# Patient Record
Sex: MALE | Race: WHITE | NOT HISPANIC OR LATINO | URBAN - METROPOLITAN AREA
[De-identification: names, ages, dates, MRNs, and addresses within clinical notes are randomized per-mention and may not be internally consistent; named-entity substitution may affect disease eponyms.]

---

## 2017-08-03 ENCOUNTER — APPOINTMENT (RX ONLY)
Dept: URBAN - METROPOLITAN AREA CLINIC 349 | Facility: CLINIC | Age: 57
Setting detail: DERMATOLOGY
End: 2017-08-03

## 2017-08-03 DIAGNOSIS — Z41.9 ENCOUNTER FOR PROCEDURE FOR PURPOSES OTHER THAN REMEDYING HEALTH STATE, UNSPECIFIED: ICD-10-CM

## 2017-08-03 PROBLEM — Z85.79 PERSONAL HISTORY OF OTHER MALIGNANT NEOPLASMS OF LYMPHOID, HEMATOPOIETIC AND RELATED TISSUES: Status: ACTIVE | Noted: 2017-08-03

## 2017-08-03 PROCEDURE — ? COSMETIC CONSULTATION: SKIN CARE PRODUCTS AND SERVICES

## 2017-08-15 ENCOUNTER — APPOINTMENT (RX ONLY)
Dept: URBAN - METROPOLITAN AREA CLINIC 349 | Facility: CLINIC | Age: 57
Setting detail: DERMATOLOGY
End: 2017-08-15

## 2017-08-15 DIAGNOSIS — Z41.9 ENCOUNTER FOR PROCEDURE FOR PURPOSES OTHER THAN REMEDYING HEALTH STATE, UNSPECIFIED: ICD-10-CM

## 2017-08-15 PROCEDURE — ? COSMETIC CONSULTATION: SKIN CARE PRODUCTS AND SERVICES

## 2017-09-08 ENCOUNTER — APPOINTMENT (RX ONLY)
Dept: URBAN - METROPOLITAN AREA CLINIC 349 | Facility: CLINIC | Age: 57
Setting detail: DERMATOLOGY
End: 2017-09-08

## 2017-09-08 DIAGNOSIS — Z41.9 ENCOUNTER FOR PROCEDURE FOR PURPOSES OTHER THAN REMEDYING HEALTH STATE, UNSPECIFIED: ICD-10-CM

## 2017-09-08 PROCEDURE — ? CHEMICAL PEEL

## 2017-09-08 NOTE — PROCEDURE: CHEMICAL PEEL
Price (Use Numbers Only, No Special Characters Or $): 85
Prep: The treated area was degreased with pre-peel cleanser, and vaseline was applied for protection of mucous membranes.
Chemical Peel: Obagi Blue
Detail Level: Simple
Post Peel Care: After the procedure, a post-peel cream was applied to the treated areas. Sun protection and post-care instructions were reviewed with the patient.
Consent: Prior to the procedure, written consent was obtained and risks were reviewed, including but not limited to: redness, peeling, blistering, pigmentary change, scarring, infection, and pain.
Post-Care Instructions: I reviewed with the patient in detail post-care instructions. Patient should avoid sun exposure and wear sun protection.
Treatment Number: 0

## 2017-10-24 ENCOUNTER — APPOINTMENT (RX ONLY)
Dept: URBAN - METROPOLITAN AREA CLINIC 349 | Facility: CLINIC | Age: 57
Setting detail: DERMATOLOGY
End: 2017-10-24

## 2017-10-24 DIAGNOSIS — Z41.9 ENCOUNTER FOR PROCEDURE FOR PURPOSES OTHER THAN REMEDYING HEALTH STATE, UNSPECIFIED: ICD-10-CM

## 2017-10-24 PROCEDURE — ? CHEMICAL PEEL

## 2017-10-24 NOTE — PROCEDURE: CHEMICAL PEEL
Chemical Peel: Obagi Blue Peel Radiance
Number Of Layers: 3
Prep: The treated area was degreased, after treatment I added a sulphuric based mask. His skin was calm after.
Post-Care Instructions: I reviewed with the patient in detail post-care instructions. Patient should avoid sun exposure and wear sun protection.
Consent: Prior to the procedure, written consent was obtained and risks were reviewed, including but not limited to: redness, peeling, blistering, pigmentary change, scarring, infection, and pain.
Detail Level: Simple
Price (Use Numbers Only, No Special Characters Or $): 85
Post Peel Care: After the procedure, a post-peel cream was applied to the treated areas. Sun protection and post-care instructions were reviewed with the patient. The post balm treatment caused the pt to have a reaction and skin became red and inflamed after application. Removed product and cooled skin with cool towels and ice packs,  and applied topical steroid . The product he reacted to had panthenol, and dimethicone.Jessica recommended desolate 2 times a day for a week. gave him enough samples to cover a week of treatment.

## 2017-11-09 ENCOUNTER — APPOINTMENT (RX ONLY)
Dept: URBAN - METROPOLITAN AREA CLINIC 349 | Facility: CLINIC | Age: 57
Setting detail: DERMATOLOGY
End: 2017-11-09

## 2017-11-09 DIAGNOSIS — L57.0 ACTINIC KERATOSIS: ICD-10-CM

## 2017-11-09 DIAGNOSIS — D22 MELANOCYTIC NEVI: ICD-10-CM

## 2017-11-09 DIAGNOSIS — L82.1 OTHER SEBORRHEIC KERATOSIS: ICD-10-CM

## 2017-11-09 PROBLEM — D22.5 MELANOCYTIC NEVI OF TRUNK: Status: ACTIVE | Noted: 2017-11-09

## 2017-11-09 PROBLEM — F32.9 MAJOR DEPRESSIVE DISORDER, SINGLE EPISODE, UNSPECIFIED: Status: ACTIVE | Noted: 2017-11-09

## 2017-11-09 PROBLEM — C44.319 BASAL CELL CARCINOMA OF SKIN OF OTHER PARTS OF FACE: Status: ACTIVE | Noted: 2017-11-09

## 2017-11-09 PROCEDURE — ? COUNSELING

## 2017-11-09 PROCEDURE — 99213 OFFICE O/P EST LOW 20 MIN: CPT | Mod: 25

## 2017-11-09 PROCEDURE — ? PATHOLOGY BILLING

## 2017-11-09 PROCEDURE — ? SHAVE REMOVAL AND DESTRUCTION

## 2017-11-09 PROCEDURE — 88305 TISSUE EXAM BY PATHOLOGIST: CPT

## 2017-11-09 PROCEDURE — 17281 DSTR MAL LS F/E/E/N/L/M .6-1: CPT

## 2017-11-09 PROCEDURE — A4550 SURGICAL TRAYS: HCPCS

## 2017-11-09 ASSESSMENT — LOCATION SIMPLE DESCRIPTION DERM
LOCATION SIMPLE: LEFT UPPER BACK
LOCATION SIMPLE: RIGHT LOWER BACK
LOCATION SIMPLE: ABDOMEN
LOCATION SIMPLE: RIGHT UPPER BACK
LOCATION SIMPLE: LEFT ZYGOMA
LOCATION SIMPLE: LEFT LOWER BACK

## 2017-11-09 ASSESSMENT — LOCATION DETAILED DESCRIPTION DERM
LOCATION DETAILED: LEFT SUPERIOR LATERAL MIDBACK
LOCATION DETAILED: RIGHT LATERAL ABDOMEN
LOCATION DETAILED: RIGHT SUPERIOR LATERAL MIDBACK
LOCATION DETAILED: LEFT LATERAL ABDOMEN
LOCATION DETAILED: PERIUMBILICAL SKIN
LOCATION DETAILED: LEFT CENTRAL ZYGOMA
LOCATION DETAILED: LEFT MID-UPPER BACK
LOCATION DETAILED: RIGHT SUPERIOR UPPER BACK

## 2017-11-09 ASSESSMENT — LOCATION ZONE DERM
LOCATION ZONE: TRUNK
LOCATION ZONE: FACE

## 2017-11-09 NOTE — PROCEDURE: SHAVE REMOVAL AND DESTRUCTION
Bill [id number] For Specimen Handling/Conveyance To Laboratory?: no
Accession #: [id number] [email or web address]
Post-Care Instructions: I reviewed with the patient in detail post-care instructions. Patient is to keep the biopsy site dry overnight, and then apply bacitracin twice daily until healed. Patient may apply hydrogen peroxide soaks to remove any crusting.  After the procedure, the patient was observed for 5-10 minutes and was oriented to,person, place and time and denied feeling dizzy, queasy and stated that they were not going to faint
Render Post-Care Instructions In Note?: yes
Billing Type: Third-Party Bill
Cautery Type: electrodesiccation
Dressing: Band-Aid
Consent: Written consent was obtained and risks were reviewed including but not limited to scarring, infection, bleeding, scabbing, incomplete removal, nerve damage and allergy to anesthesia.
Wound Care: Vaseline
Anesthesia Volume In Cc: 0
Size Of Lesion In Cm: 0.7
Anesthesia Type: 1% lidocaine with epinephrine
Hemostasis: Electrocautery
Anesthesia Volume In Cc: 0.3
Number Of Curettages: 1
Notification Instructions: Patient will be notified of biopsy results. However, patient instructed to call the office if not contacted within 2 weeks.
Size After Destruction (Required For Destruction Billing): 0.8
Bill As?: Note: Bill Malignant Destruction If Path Confirms Malignant Lesion. Only Bill As Shave Removal If Path Comes Back Benign. Do Not Bill Shave Removal On Malignant Lesions.: Malignant Destruction
Detail Level: Detailed

## 2017-12-04 PROBLEM — I10 ESSENTIAL HYPERTENSION: Status: ACTIVE | Noted: 2017-12-04

## 2018-01-05 PROBLEM — F33.9 DEPRESSION, RECURRENT (HCC): Status: ACTIVE | Noted: 2018-01-05

## 2018-01-05 PROBLEM — F33.9 DEPRESSION, RECURRENT (HCC): Status: RESOLVED | Noted: 2018-01-05 | Resolved: 2018-01-05

## 2018-01-05 PROBLEM — F33.40 DEPRESSION, MAJOR, RECURRENT, IN REMISSION (HCC): Status: ACTIVE | Noted: 2018-01-05

## 2018-01-05 PROBLEM — R39.11 BENIGN PROSTATIC HYPERPLASIA WITH HESITANCY: Status: ACTIVE | Noted: 2018-01-05

## 2018-01-05 PROBLEM — N40.1 BENIGN PROSTATIC HYPERPLASIA WITH HESITANCY: Status: ACTIVE | Noted: 2018-01-05

## 2018-05-10 ENCOUNTER — APPOINTMENT (RX ONLY)
Dept: URBAN - METROPOLITAN AREA CLINIC 349 | Facility: CLINIC | Age: 58
Setting detail: DERMATOLOGY
End: 2018-05-10

## 2018-05-10 DIAGNOSIS — Z85.828 PERSONAL HISTORY OF OTHER MALIGNANT NEOPLASM OF SKIN: ICD-10-CM

## 2018-05-10 DIAGNOSIS — L57.0 ACTINIC KERATOSIS: ICD-10-CM

## 2018-05-10 DIAGNOSIS — D22 MELANOCYTIC NEVI: ICD-10-CM

## 2018-05-10 DIAGNOSIS — L82.1 OTHER SEBORRHEIC KERATOSIS: ICD-10-CM

## 2018-05-10 PROBLEM — D22.39 MELANOCYTIC NEVI OF OTHER PARTS OF FACE: Status: ACTIVE | Noted: 2018-05-10

## 2018-05-10 PROCEDURE — ? PATHOLOGY BILLING

## 2018-05-10 PROCEDURE — 88305 TISSUE EXAM BY PATHOLOGIST: CPT

## 2018-05-10 PROCEDURE — 17000 DESTRUCT PREMALG LESION: CPT

## 2018-05-10 PROCEDURE — ? BIOPSY BY SHAVE METHOD

## 2018-05-10 PROCEDURE — ? LIQUID NITROGEN

## 2018-05-10 PROCEDURE — A4550 SURGICAL TRAYS: HCPCS

## 2018-05-10 PROCEDURE — 11101: CPT

## 2018-05-10 PROCEDURE — 11100: CPT | Mod: 59

## 2018-05-10 PROCEDURE — ? OTHER

## 2018-05-10 PROCEDURE — ? COUNSELING

## 2018-05-10 PROCEDURE — 99213 OFFICE O/P EST LOW 20 MIN: CPT | Mod: 25

## 2018-05-10 PROCEDURE — 17003 DESTRUCT PREMALG LES 2-14: CPT

## 2018-05-10 ASSESSMENT — LOCATION DETAILED DESCRIPTION DERM
LOCATION DETAILED: LEFT INFERIOR CENTRAL MALAR CHEEK
LOCATION DETAILED: LEFT SUPERIOR LATERAL MALAR CHEEK
LOCATION DETAILED: LEFT DISTAL PRETIBIAL REGION
LOCATION DETAILED: RIGHT DISTAL PRETIBIAL REGION
LOCATION DETAILED: RIGHT MEDIAL FRONTAL SCALP
LOCATION DETAILED: LEFT DISTAL PRETIBIAL REGION
LOCATION DETAILED: LEFT INFERIOR LATERAL MALAR CHEEK
LOCATION DETAILED: LEFT INFERIOR CENTRAL MALAR CHEEK
LOCATION DETAILED: LEFT SUPERIOR PARIETAL SCALP
LOCATION DETAILED: INFERIOR THORACIC SPINE
LOCATION DETAILED: LEFT SUPERIOR LATERAL MALAR CHEEK
LOCATION DETAILED: LEFT SUPERIOR HELIX
LOCATION DETAILED: LEFT SUPERIOR MEDIAL MIDBACK
LOCATION DETAILED: LEFT INFERIOR MEDIAL MIDBACK
LOCATION DETAILED: LEFT SUPERIOR CENTRAL BUCCAL CHEEK
LOCATION DETAILED: RIGHT CENTRAL FRONTAL SCALP
LOCATION DETAILED: LEFT SUPERIOR HELIX
LOCATION DETAILED: RIGHT CENTRAL PARIETAL SCALP

## 2018-05-10 ASSESSMENT — LOCATION SIMPLE DESCRIPTION DERM
LOCATION SIMPLE: LEFT PRETIBIAL REGION
LOCATION SIMPLE: LEFT CHEEK
LOCATION SIMPLE: LEFT EAR
LOCATION SIMPLE: LEFT EAR
LOCATION SIMPLE: LEFT PRETIBIAL REGION
LOCATION SIMPLE: LEFT LOWER BACK
LOCATION SIMPLE: UPPER BACK
LOCATION SIMPLE: LEFT LOWER BACK
LOCATION SIMPLE: RIGHT SCALP
LOCATION SIMPLE: LEFT CHEEK
LOCATION SIMPLE: RIGHT PRETIBIAL REGION
LOCATION SIMPLE: SCALP

## 2018-05-10 ASSESSMENT — LOCATION ZONE DERM
LOCATION ZONE: LEG
LOCATION ZONE: TRUNK
LOCATION ZONE: EAR
LOCATION ZONE: TRUNK
LOCATION ZONE: FACE
LOCATION ZONE: SCALP
LOCATION ZONE: FACE
LOCATION ZONE: EAR
LOCATION ZONE: LEG

## 2018-05-10 NOTE — PROCEDURE: BIOPSY BY SHAVE METHOD
Dressing: bandage
Type Of Destruction Used: Curettage
Electrodesiccation And Curettage Text: The wound bed was treated with electrodesiccation and curettage after the biopsy was performed.
Anesthesia Type: 1% lidocaine without epinephrine
Size Of Lesion In Cm: 0
Notification Instructions: Patient will be notified of biopsy results. However, patient instructed to call the office if not contacted within 2 weeks.
Biopsy Method: Dermablade
Was A Bandage Applied: Yes
Electrodesiccation Text: The wound bed was treated with electrodesiccation after the biopsy was performed.
Post-Care Instructions: I reviewed with the patient in detail post-care instructions. Patient is to keep the biopsy site dry overnight, and then apply bacitracin twice daily until healed. Patient may apply hydrogen peroxide soaks to remove any crusting.  After the procedure, the patient was observed for 5-10 minutes and was oriented to person, place and time and demied feeling dizzy, queasy, and stated that they did not feel that they were going to faint.
Bill 95304 For Specimen Handling/Conveyance To Laboratory?: no
Accession #: [id number] [email or web address] read
Hemostasis: Drysol
Billing Type: Third-Party Bill
Detail Level: Detailed
Size Of Lesion In Cm: 0.7
Consent: Written consent was obtained and risks were reviewed including but not limited to scarring, infection, bleeding, scabbing, incomplete removal, nerve damage and allergy to anesthesia.
Silver Nitrate Text: The wound bed was treated with silver nitrate after the biopsy was performed.
Biopsy Type: H and E
Cryotherapy Text: The wound bed was treated with cryotherapy after the biopsy was performed.
Wound Care: Vaseline
Anesthesia Volume In Cc (Will Not Render If 0): 0.5

## 2018-05-10 NOTE — PROCEDURE: LIQUID NITROGEN
Render Post-Care Instructions In Note?: no
Detail Level: Simple
Number Of Freeze-Thaw Cycles: 2 freeze-thaw cycles
Consent: The patient's consent was obtained including but not limited to risks of crusting, scabbing, blistering, scarring, darker or lighter pigmentary change, recurrence, incomplete removal and infection.
Duration Of Freeze Thaw-Cycle (Seconds): 3
Post-Care Instructions: I reviewed with the patient in detail post-care instructions. Patient is to wear sunprotection, and avoid picking at any of the treated lesions. Pt may apply Vaseline to crusted or scabbing areas.

## 2018-06-19 ENCOUNTER — ANESTHESIA EVENT (OUTPATIENT)
Dept: SURGERY | Age: 58
End: 2018-06-19
Payer: COMMERCIAL

## 2018-06-20 ENCOUNTER — HOSPITAL ENCOUNTER (OUTPATIENT)
Age: 58
Setting detail: OUTPATIENT SURGERY
Discharge: HOME | End: 2018-06-20
Attending: ORTHOPAEDIC SURGERY | Admitting: ORTHOPAEDIC SURGERY
Payer: COMMERCIAL

## 2018-06-20 ENCOUNTER — ANESTHESIA (OUTPATIENT)
Dept: SURGERY | Age: 58
End: 2018-06-20
Payer: COMMERCIAL

## 2018-06-20 VITALS
OXYGEN SATURATION: 97 % | RESPIRATION RATE: 16 BRPM | DIASTOLIC BLOOD PRESSURE: 84 MMHG | SYSTOLIC BLOOD PRESSURE: 140 MMHG | HEART RATE: 86 BPM | TEMPERATURE: 97.6 F

## 2018-06-20 DIAGNOSIS — L76.82 PAIN AT SURGICAL INCISION: Primary | ICD-10-CM

## 2018-06-20 PROCEDURE — 74011250636 HC RX REV CODE- 250/636: Performed by: ANESTHESIOLOGY

## 2018-06-20 PROCEDURE — 77030018836 HC SOL IRR NACL ICUM -A: Performed by: ORTHOPAEDIC SURGERY

## 2018-06-20 PROCEDURE — 76010000162 HC OR TIME 1.5 TO 2 HR INTENSV-TIER 1: Performed by: ORTHOPAEDIC SURGERY

## 2018-06-20 PROCEDURE — 74011250636 HC RX REV CODE- 250/636

## 2018-06-20 PROCEDURE — 77030002933 HC SUT MCRYL J&J -A: Performed by: ORTHOPAEDIC SURGERY

## 2018-06-20 PROCEDURE — 77030032490 HC SLV COMPR SCD KNE COVD -B: Performed by: ORTHOPAEDIC SURGERY

## 2018-06-20 PROCEDURE — 76210000063 HC OR PH I REC FIRST 0.5 HR: Performed by: ORTHOPAEDIC SURGERY

## 2018-06-20 PROCEDURE — 77030011640 HC PAD GRND REM COVD -A: Performed by: ORTHOPAEDIC SURGERY

## 2018-06-20 PROCEDURE — 77030000032 HC CUF TRNQT ZIMM -B: Performed by: ORTHOPAEDIC SURGERY

## 2018-06-20 PROCEDURE — 76010010054 HC POST OP PAIN BLOCK: Performed by: ORTHOPAEDIC SURGERY

## 2018-06-20 PROCEDURE — 77030019940 HC BLNKT HYPOTHRM STRY -B: Performed by: ANESTHESIOLOGY

## 2018-06-20 PROCEDURE — 77030003602 HC NDL NRV BLK BBMI -B: Performed by: ANESTHESIOLOGY

## 2018-06-20 PROCEDURE — 77030008467 HC STPLR SKN COVD -B: Performed by: ORTHOPAEDIC SURGERY

## 2018-06-20 PROCEDURE — 74011000250 HC RX REV CODE- 250

## 2018-06-20 PROCEDURE — C1713 ANCHOR/SCREW BN/BN,TIS/BN: HCPCS | Performed by: ORTHOPAEDIC SURGERY

## 2018-06-20 PROCEDURE — 77030020143 HC AIRWY LARYN INTUB CGAS -A: Performed by: ANESTHESIOLOGY

## 2018-06-20 PROCEDURE — 76942 ECHO GUIDE FOR BIOPSY: CPT | Performed by: ORTHOPAEDIC SURGERY

## 2018-06-20 PROCEDURE — 76210000020 HC REC RM PH II FIRST 0.5 HR: Performed by: ORTHOPAEDIC SURGERY

## 2018-06-20 PROCEDURE — 74011250636 HC RX REV CODE- 250/636: Performed by: ORTHOPAEDIC SURGERY

## 2018-06-20 PROCEDURE — 77030003902 HC BIT DRL TWST ZIMM -B: Performed by: ORTHOPAEDIC SURGERY

## 2018-06-20 PROCEDURE — 76060000035 HC ANESTHESIA 2 TO 2.5 HR: Performed by: ORTHOPAEDIC SURGERY

## 2018-06-20 DEVICE — ANCHOR SUT L14MM DIA4.5MM FULL THRD W/ TWO SZ 1 FIBERWIRE: Type: IMPLANTABLE DEVICE | Site: KNEE | Status: FUNCTIONAL

## 2018-06-20 DEVICE — IMPLANTABLE DEVICE: Type: IMPLANTABLE DEVICE | Site: KNEE | Status: FUNCTIONAL

## 2018-06-20 RX ORDER — CEFAZOLIN SODIUM/WATER 2 G/20 ML
2 SYRINGE (ML) INTRAVENOUS ONCE
Status: COMPLETED | OUTPATIENT
Start: 2018-06-20 | End: 2018-06-20

## 2018-06-20 RX ORDER — OXYCODONE HYDROCHLORIDE 5 MG/1
CAPSULE ORAL
Qty: 60 CAP | Refills: 0 | Status: SHIPPED | OUTPATIENT
Start: 2018-06-20 | End: 2018-08-24

## 2018-06-20 RX ORDER — ASPIRIN 81 MG/1
81 TABLET ORAL EVERY 12 HOURS
Qty: 60 TAB | Refills: 0 | Status: SHIPPED | OUTPATIENT
Start: 2018-06-20

## 2018-06-20 RX ORDER — SODIUM CHLORIDE, SODIUM LACTATE, POTASSIUM CHLORIDE, CALCIUM CHLORIDE 600; 310; 30; 20 MG/100ML; MG/100ML; MG/100ML; MG/100ML
100 INJECTION, SOLUTION INTRAVENOUS CONTINUOUS
Status: DISCONTINUED | OUTPATIENT
Start: 2018-06-20 | End: 2018-06-20 | Stop reason: HOSPADM

## 2018-06-20 RX ORDER — SODIUM CHLORIDE 0.9 % (FLUSH) 0.9 %
5-10 SYRINGE (ML) INJECTION AS NEEDED
Status: DISCONTINUED | OUTPATIENT
Start: 2018-06-20 | End: 2018-06-20 | Stop reason: HOSPADM

## 2018-06-20 RX ORDER — EPHEDRINE SULFATE 50 MG/ML
INJECTION, SOLUTION INTRAVENOUS AS NEEDED
Status: DISCONTINUED | OUTPATIENT
Start: 2018-06-20 | End: 2018-06-20 | Stop reason: HOSPADM

## 2018-06-20 RX ORDER — SODIUM CHLORIDE 0.9 % (FLUSH) 0.9 %
5-10 SYRINGE (ML) INJECTION EVERY 8 HOURS
Status: DISCONTINUED | OUTPATIENT
Start: 2018-06-20 | End: 2018-06-20 | Stop reason: HOSPADM

## 2018-06-20 RX ORDER — LIDOCAINE HYDROCHLORIDE 20 MG/ML
INJECTION, SOLUTION EPIDURAL; INFILTRATION; INTRACAUDAL; PERINEURAL AS NEEDED
Status: DISCONTINUED | OUTPATIENT
Start: 2018-06-20 | End: 2018-06-20 | Stop reason: HOSPADM

## 2018-06-20 RX ORDER — HYDROMORPHONE HYDROCHLORIDE 2 MG/ML
0.5 INJECTION, SOLUTION INTRAMUSCULAR; INTRAVENOUS; SUBCUTANEOUS
Status: DISCONTINUED | OUTPATIENT
Start: 2018-06-20 | End: 2018-06-20 | Stop reason: HOSPADM

## 2018-06-20 RX ORDER — ROPIVACAINE HYDROCHLORIDE 5 MG/ML
INJECTION, SOLUTION EPIDURAL; INFILTRATION; PERINEURAL AS NEEDED
Status: DISCONTINUED | OUTPATIENT
Start: 2018-06-20 | End: 2018-06-20 | Stop reason: HOSPADM

## 2018-06-20 RX ORDER — PROPOFOL 10 MG/ML
INJECTION, EMULSION INTRAVENOUS AS NEEDED
Status: DISCONTINUED | OUTPATIENT
Start: 2018-06-20 | End: 2018-06-20 | Stop reason: HOSPADM

## 2018-06-20 RX ORDER — CEFAZOLIN SODIUM/WATER 2 G/20 ML
2 SYRINGE (ML) INTRAVENOUS ONCE
Status: DISCONTINUED | OUTPATIENT
Start: 2018-06-20 | End: 2018-06-20

## 2018-06-20 RX ORDER — LIDOCAINE HYDROCHLORIDE 10 MG/ML
0.3 INJECTION INFILTRATION; PERINEURAL ONCE
Status: DISCONTINUED | OUTPATIENT
Start: 2018-06-20 | End: 2018-06-20 | Stop reason: HOSPADM

## 2018-06-20 RX ORDER — FENTANYL CITRATE 50 UG/ML
100 INJECTION, SOLUTION INTRAMUSCULAR; INTRAVENOUS ONCE
Status: COMPLETED | OUTPATIENT
Start: 2018-06-20 | End: 2018-06-20

## 2018-06-20 RX ORDER — OXYCODONE HYDROCHLORIDE 5 MG/1
10 TABLET ORAL
Status: DISCONTINUED | OUTPATIENT
Start: 2018-06-20 | End: 2018-06-20 | Stop reason: HOSPADM

## 2018-06-20 RX ORDER — ONDANSETRON 2 MG/ML
INJECTION INTRAMUSCULAR; INTRAVENOUS AS NEEDED
Status: DISCONTINUED | OUTPATIENT
Start: 2018-06-20 | End: 2018-06-20 | Stop reason: HOSPADM

## 2018-06-20 RX ORDER — MIDAZOLAM HYDROCHLORIDE 1 MG/ML
2 INJECTION, SOLUTION INTRAMUSCULAR; INTRAVENOUS
Status: COMPLETED | OUTPATIENT
Start: 2018-06-20 | End: 2018-06-20

## 2018-06-20 RX ORDER — FENTANYL CITRATE 50 UG/ML
INJECTION, SOLUTION INTRAMUSCULAR; INTRAVENOUS AS NEEDED
Status: DISCONTINUED | OUTPATIENT
Start: 2018-06-20 | End: 2018-06-20 | Stop reason: HOSPADM

## 2018-06-20 RX ORDER — DEXAMETHASONE SODIUM PHOSPHATE 4 MG/ML
INJECTION, SOLUTION INTRA-ARTICULAR; INTRALESIONAL; INTRAMUSCULAR; INTRAVENOUS; SOFT TISSUE AS NEEDED
Status: DISCONTINUED | OUTPATIENT
Start: 2018-06-20 | End: 2018-06-20 | Stop reason: HOSPADM

## 2018-06-20 RX ADMIN — EPHEDRINE SULFATE 10 MG: 50 INJECTION, SOLUTION INTRAVENOUS at 13:25

## 2018-06-20 RX ADMIN — EPHEDRINE SULFATE 10 MG: 50 INJECTION, SOLUTION INTRAVENOUS at 13:08

## 2018-06-20 RX ADMIN — EPHEDRINE SULFATE 5 MG: 50 INJECTION, SOLUTION INTRAVENOUS at 13:36

## 2018-06-20 RX ADMIN — SODIUM CHLORIDE, SODIUM LACTATE, POTASSIUM CHLORIDE, AND CALCIUM CHLORIDE: 600; 310; 30; 20 INJECTION, SOLUTION INTRAVENOUS at 13:10

## 2018-06-20 RX ADMIN — Medication 2 G: at 12:46

## 2018-06-20 RX ADMIN — ONDANSETRON 4 MG: 2 INJECTION INTRAMUSCULAR; INTRAVENOUS at 13:22

## 2018-06-20 RX ADMIN — DEXAMETHASONE SODIUM PHOSPHATE 4 MG: 4 INJECTION, SOLUTION INTRA-ARTICULAR; INTRALESIONAL; INTRAMUSCULAR; INTRAVENOUS; SOFT TISSUE at 13:18

## 2018-06-20 RX ADMIN — SODIUM CHLORIDE, SODIUM LACTATE, POTASSIUM CHLORIDE, AND CALCIUM CHLORIDE 100 ML/HR: 600; 310; 30; 20 INJECTION, SOLUTION INTRAVENOUS at 11:00

## 2018-06-20 RX ADMIN — MIDAZOLAM HYDROCHLORIDE 2 MG: 1 INJECTION, SOLUTION INTRAMUSCULAR; INTRAVENOUS at 11:23

## 2018-06-20 RX ADMIN — PROPOFOL 300 MG: 10 INJECTION, EMULSION INTRAVENOUS at 12:47

## 2018-06-20 RX ADMIN — EPHEDRINE SULFATE 10 MG: 50 INJECTION, SOLUTION INTRAVENOUS at 13:02

## 2018-06-20 RX ADMIN — EPHEDRINE SULFATE 5 MG: 50 INJECTION, SOLUTION INTRAVENOUS at 13:16

## 2018-06-20 RX ADMIN — LIDOCAINE HYDROCHLORIDE 100 MG: 20 INJECTION, SOLUTION EPIDURAL; INFILTRATION; INTRACAUDAL; PERINEURAL at 12:47

## 2018-06-20 RX ADMIN — FENTANYL CITRATE 50 MCG: 50 INJECTION, SOLUTION INTRAMUSCULAR; INTRAVENOUS at 12:44

## 2018-06-20 RX ADMIN — FENTANYL CITRATE 100 MCG: 50 INJECTION INTRAMUSCULAR; INTRAVENOUS at 11:23

## 2018-06-20 RX ADMIN — ROPIVACAINE HYDROCHLORIDE 30 ML: 5 INJECTION, SOLUTION EPIDURAL; INFILTRATION; PERINEURAL at 11:28

## 2018-06-20 NOTE — ANESTHESIA PROCEDURE NOTES
Femoral block with ultrasound    Start time: 6/20/2018 11:25 AM  End time: 6/20/2018 11:28 AM  Performed by: Kit Lock  Authorized by: Kit Lock       Pre-procedure:    Indications: at surgeon's request and post-op pain management    Preanesthetic Checklist: patient identified, risks and benefits discussed, site marked, timeout performed, anesthesia consent given and patient being monitored    Timeout Time: 11:25          Block Type:   Block Type:  Femoral single shot  Laterality:  Right  Monitoring:  Continuous pulse ox, frequent vital sign checks, responsive to questions, oxygen and heart rate  Injection Technique:  Single shot  Procedures: ultrasound guided    Patient Position: supine  Prep: chlorhexidine    Location:  Upper thigh  Needle Type:  Stimuplex  Needle Gauge:  20 G  Needle Localization:  Ultrasound guidance  Medication Injected:  0.5%  ropivacaine  Volume (mL):  30    Assessment:  Number of attempts:  1  Injection Assessment:  Incremental injection every 5 mL, local visualized surrounding nerve on ultrasound, negative aspiration for blood, no intravascular symptoms, no paresthesia and ultrasound image on chart  Patient tolerance:  Patient tolerated the procedure well with no immediate complications

## 2018-06-20 NOTE — OP NOTES
[facility] REPORT    Yolanda Cronin  MR#: [id number]  : 1960  ACCOUNT #: [de-identified]   DATE OF SERVICE: 2018    PREOPERATIVE DIAGNOSIS:  Right quadriceps tendon tear. POSTOPERATIVE DIAGNOSIS:  Right quadriceps tendon tear. PROCEDURE:  Right quadriceps tendon repair. SURGEON:  Taniya Rg MD    ANESTHESIA:  General with preoperative femoral nerve block. COMPLICATIONS:  None. SPECIMENS:  None. BLOOD LOSS:  Minimal.    IMPLANTS:  Two Arthrex BioComposite corkscrew anchors, Arthrex ArthroFlex patch. ASSISTANTS:  None. PROCEDURE IN DETAIL:  After discussion of risks, benefits, and alternatives, the patient expressed understanding and strongly desired to proceed with surgical treatment. He had an MRI-confirmed quadriceps tendon tear. He was brought to the operating room. After verification of the patient, site, side, diagnosis and procedure, general anesthesia was administered. He had already undergone a right femoral nerve block, which he tolerated well. The right lower extremity was prepped and draped in normal sterile fashion. Sterile tourniquet was applied. A timeout was performed. Limb was exsanguinated, tourniquet was inflated, and an anterior incision was created. Dissection was carried out down to the level of the terminal quadriceps tendon. There was a large amount of hematoma at the site. His quadriceps tendon was noted to be torn and retracted with significant longitudinal tearing and shredding of the tendon. A remnant of the deeper portion of the tendon was noted to be intact. There was no intact tendon distal to the tear. Once the tear was fully delineated, the torn tendon was initially reapproximated to the superior pole of the patella using 2 Arthrex BioComposite corkscrew anchors. Excellent purchase was obtained with these anchors. The initial tensioning was performed with 2 limbs of suture. Additional suture was then used to sew in a modified Krackow type fashion up the sides of the tendon. Additional suture passes were placed across the full width of the tendon in order to reapproximate the appropriate morphology of the tendon. A repair of this to the intact deep tendon was performed as well in order to reinforce the repair. A side-to-side repair with the VMO was also performed. Similar side-to-side repair was performed to the vastus lateralis tissue. A portion of the terminal tendon was then reinforced to the medial and lateral retinacula adjacent to the patella to reinforce the repair. This actually accomplished an excellent approximation of the tendon with the tendon lying flat. An Arthrex ArthroFlex patch was then selected and attached to the superficial aspect of the repaired quad tendon. It was trimmed to size. The wound was copiously irrigated. Please note prior to the quad tendon repair, the intraarticular portion of the knee was irrigated with sterile saline as well. Layered wound closure was performed. The repair was noted to be stable to about 30 degrees, but due to the amount of longitudinal tearing and my concerns for stability of the repair, I did not range it beyond that. After layered skin closure was accomplished, sterile dressings were applied and the patient was placed into a hinged knee brace locked in extension. He tolerated the procedure well. There were no complications. No specimens sent to pathology. POSTOPERATIVE PLAN:  Touchdown weightbearing with crutches with the knee at 0 degrees of flexion in a brace at all times. He will begin knee flexion after 8 weeks. I do think he is at risk for some stiffness, but my concerns for stability of the repair are being placed above that somewhat. Due to his baseline level of fitness and motivation, I think he will recover well once he is able to start moving his knee.   We will plan for gradual increases in his range of motion after the 8-week emilie. He will be discharged with p.o. pain medication.       MD JAZMIN Alarcon / Tiffany [place]  D: 06/20/2018 14:56     T: 06/20/2018 15:35  JOB #: [id number]  CC: Daksha ROSADO [facility]  [address] [place] [address]  [place], [address] [facility]

## 2018-06-20 NOTE — ANESTHESIA POSTPROCEDURE EVALUATION
Post-Anesthesia Evaluation and Assessment    Patient: Eleni Simpson MRN: [id number]  SSN: [id number]    YOB: 1960  Age: 62 y.o. Sex: male       Cardiovascular Function/Vital Signs  Visit Vitals    /65    Pulse 81    Temp 36.4 °C (97.6 °F)    Resp 14    SpO2 96%       Patient is status post general anesthesia for Procedure(s):  QUADRICEPS REPAIR RIGHT. Nausea/Vomiting: None    Postoperative hydration reviewed and adequate. Pain:  Pain Scale 1: Numeric (0 - 10) (06/20/18 1442)  Pain Intensity 1: 0 (06/20/18 1442)   Managed    Neurological Status:   Neuro (WDL): Exceptions to WDL (06/20/18 1442)  Neuro  Neurologic State: Drowsy (06/20/18 1442)  RLE Motor Response: Numbness (06/20/18 1442)   At baseline    Mental Status and Level of Consciousness: Alert and oriented     Pulmonary Status:   O2 Device: Nasal cannula (06/20/18 1442)   Adequate oxygenation and airway patent    Complications related to anesthesia: None    Post-anesthesia assessment completed.  No concerns    Signed By: Willa Dick MD     June 20, 2018

## 2018-06-20 NOTE — DISCHARGE INSTRUCTIONS
R knee brace at all times  No knee flexion until released to do so  Call with questions or problems  Aspirin 81mg twice daily for DVT prevention  Call for Monday appointment for dressing change. TYPICAL SIDE EFFECTS OF PAIN MEDICATION:  *    Constipation: Drink lots of fluids. Over the counter stool softener if needed. *    Nausea: Take pain medication with food. Call your doctor with persistent nausea. ACTIVITY  · As tolerated and as directed by your doctor. · Bathe or shower as directed by your doctor. DIET  · Day of surgery: Clear liquids until no nausea or vomiting; small portion, light diet Inyo foods (ex: baked chicken, plain rice, grits, scrambled eggs, toast). Nothing greasy, fried or spicy today. · Advance to regular diet on second day, unless your doctor orders otherwise. · If nausea and vomiting continues, call your doctor. PAIN  · Take pain medication as directed by your doctor. · DO NOT take aspirin or blood thinners unless directed by your doctor. CALL YOUR DOCTOR IF    s Call your doctor if pain is NOT relieved by medication.   s Excessive bleeding that does not stop after holding pressure over the area  · Temperature of 101 degrees F or above  · Excessive redness, swelling or bruising, and/ or green or yellow, smelly discharge from incision    AFTER ANESTHESIA   · For the first 24 hours: DO NOT Drive, Drink alcoholic beverages, or Make important decisions. · Be aware of dizziness following anesthesia and while taking pain medication.        DISCHARGE SUMMARY from Nurse    PATIENT INSTRUCTIONS:    After general anesthesia or intravenous sedation, for 24 hours or while taking prescription Narcotics:  · Limit your activities  · Do not drive and operate hazardous machinery  · Do not make important personal or business decisions  · Do  not drink alcoholic beverages  · If you have not urinated within 8 hours after discharge, please contact your surgeon on call.    *  Please give a list of your current medications to your Primary Care Provider. *  Please update this list whenever your medications are discontinued, doses are      changed, or new medications (including over-the-counter products) are added. *  Please carry medication information at all times in case of emergency situations. Preventing Infection at Home  We care about preventing infection and avoiding the spread of germs - not only when you are in the hospital but also when you return home. When you return home from the hospital, its important to take the following steps to help prevent infection and avoid spreading germs that could infect you and others. Ask everyone in your home to follow these guidelines, too. Clean Your Hands  · Clean your hands whenever your hands are visibly dirty, before you eat, before or after touching your mouth, nose or eyes, and before preparing food. Clean them after contact with body fluids, using the restroom, touching animals or changing diapers. · When washing hands, wet them with warm water and work up a lather. Rub hands for at least 15 seconds, then rinse them and pat them dry with a clean towel or paper towel. · When using hand sanitizers, it should take about 15 seconds to rub your hands dry. If not, you probably didnt apply enough . Cover Your Sneeze or Cough  Germs are released into the air whenever you sneeze or cough. To prevent the spread of infection:  · Turn away from other people before coughing or sneezing. · Cover your mouth or nose with a tissue when you cough or sneeze. Put the tissue in the trash. · If you dont have a tissue, cough or sneeze into your upper sleeve, not your hands. · Always clean your hands after coughing or sneezing. Care for Wounds  Your skin is your bodys first line of defense against germs, but an open wound leaves an easy way for germs to enter your body.  To prevent infection:  · Clean your hands before and after changing wound dressings, and wear gloves to change dressings if recommended by your doctor. · Take special care with IV lines or other devices inserted into the body. If you must touch them, clean your hands first.  · Follow any specific instructions from your doctor to care for your wounds. Contact your doctor if you experience any signs of infection, such as fever or increased redness at the surgical or wound site. Keep a Clean Home  · Clean or wipe commonly touched hard surfaces like door handles, sinks, tabletops, phones and TV remotes. · Use products labeled disinfectant to kill harmful bacteria and viruses. · Use a clean cloth or paper towel to clean and dry surfaces. Wiping surfaces with a dirty dishcloth, sponge or towel will only spread germs. · Never share toothbrushes, griffith, drinking glasses, utensils, razor blades, face cloths or bath towels to avoid spreading germs. · Be sure that the linens that you sleep on are clean. · Keep pets away from wounds and wash your hands after touching pets, their toys or bedding. We care about you and your health. Remember, preventing infections is a team effort between you, your family, friends and health care providers. These are general instructions for a healthy lifestyle:    No smoking/ No tobacco products/ Avoid exposure to second hand smoke    Surgeon General's Warning:  Quitting smoking now greatly reduces serious risk to your health. Obesity, smoking, and sedentary lifestyle greatly increases your risk for illness    A healthy diet, regular physical exercise & weight monitoring are important for maintaining a healthy lifestyle    You may be retaining fluid if you have a history of heart failure or if you experience any of the following symptoms:  Weight gain of 3 pounds or more overnight or 5 pounds in a week, increased swelling in our hands or feet or shortness of breath while lying flat in bed.   Please call your doctor as soon as you notice any of these symptoms; do not wait until your next office visit. Recognize signs and symptoms of STROKE:    F-face looks uneven    A-arms unable to move or move unevenly    S-speech slurred or non-existent    T-time-call 911 as soon as signs and symptoms begin-DO NOT go       Back to bed or wait to see if you get better-TIME IS BRAIN.

## 2018-06-20 NOTE — ANESTHESIA PREPROCEDURE EVALUATION
Anesthetic History               Review of Systems / Medical History  Patient summary reviewed and pertinent labs reviewed    Pulmonary  Within defined limits                 Neuro/Psych         Psychiatric history    Comments: History of bell's palsy Cardiovascular    Hypertension              Exercise tolerance: >4 METS     GI/Hepatic/Renal  Within defined limits              Endo/Other             Other Findings   Comments: H/o lymphoma           Physical Exam    Airway  Mallampati: I  TM Distance: 4 - 6 cm  Neck ROM: normal range of motion   Mouth opening: Normal     Cardiovascular    Rhythm: regular  Rate: normal         Dental  No notable dental hx       Pulmonary  Breath sounds clear to auscultation               Abdominal         Other Findings            Anesthetic Plan    ASA: 2  Anesthesia type: general      Post-op pain plan if not by surgeon: peripheral nerve block single    Induction: Intravenous  Anesthetic plan and risks discussed with: Patient and Spouse

## 2018-06-20 NOTE — IP AVS SNAPSHOT
[address] [facility] 
 
 
 [address] 
[phone] Patient: Mellisa Waite MRN: [id number] About your hospitalization You were admitted on:  June 20, 2018 You last received care in the:  [facility] OP PACU You were discharged on:  June 20, 2018 Why you were hospitalized Your primary diagnosis was:  Not on File Follow-up Information Follow up With Details Comments Contact Info Chemo Amaral MD   [address] [facility]. [address] [facility] [address] 
[phone] Goldy Helm MD  Call for Monday appointment for dressing change per Dr. Maciel Bradley. [address] Your Scheduled Appointments Friday July 06, 2018  8:30 AM EDT Follow Up with Chemo Amaral MD  
[facility] Internal Medicine ([facility] INTERNAL MEDICINE) [address] [facility] [address]  
[phone] Discharge Orders None A check emilie indicates which time of day the medication should be taken. My Medications START taking these medications Instructions Each Dose to Equal  
 Morning Noon Evening Bedtime  
 oxyCODONE 5 mg capsule Commonly known as:  OXYIR Your last dose was: Your next dose is:    
   
   
 1-2 po q 4-6 hours prn pain CHANGE how you take these medications Instructions Each Dose to Equal  
 Morning Noon Evening Bedtime  
 aspirin delayed-release 81 mg tablet What changed:   
- when to take this 
- additional instructions Your last dose was: Your next dose is: Take 1 Tab by mouth every twelve (12) hours every twelve (12) hours. 81 mg  
    
   
   
   
  
 lisinopril 10 mg tablet Commonly known as:  Kirti Tulio What changed:  See the new instructions. Your last dose was: Your next dose is: TAKE 1 TABLET BY MOUTH DAILY pregabalin 75 mg capsule Commonly known as:  Doug Andrews What changed:  additional instructions Your last dose was: Your next dose is: Take 3 tabs po bid CONTINUE taking these medications Instructions Each Dose to Equal  
 Morning Noon Evening Bedtime  
 lamoTRIgine 25 mg tablet Commonly known as: LaMICtal  
   
Your last dose was: Your next dose is: Take 25 mg by mouth two (2) times a day. 25 mg  
    
   
   
   
  
 tamsulosin 0.4 mg capsule Commonly known as:  FLOMAX Your last dose was: Your next dose is: TAKE 1 CAPSULE BY MOUTH DAILY  
     
   
   
   
  
 venlafaxine-SR 75 mg capsule Commonly known as:  EFFEXOR-XR Your last dose was: Your next dose is: Take 150 mg by mouth daily. 150 mg Where to Get Your Medications Information on where to get these meds will be given to you by the nurse or doctor. ! Ask your nurse or doctor about these medications  
  aspirin delayed-release 81 mg tablet  
 oxyCODONE 5 mg capsule Opioid Education Prescription Opioids: What You Need to Know: 
 
Prescription opioids can be used to help relieve moderate-to-severe pain and are often prescribed following a surgery or injury, or for certain health conditions. These medications can be an important part of treatment but also come with serious risks. Opioids are strong pain medicines. Examples include hydrocodone, oxycodone, fentanyl, and morphine. Heroin is an example of an illegal opioid. It is important to work with your health care provider to make sure you are getting the safest, most effective care. WHAT ARE THE RISKS AND SIDE EFFECTS OF OPIOID USE?  
Prescription opioids carry serious risks of addiction and overdose, especially with prolonged use. An opioid overdose, often marked by slow breathing, can cause sudden death. The use of prescription opioids can have a number of side effects as well, even when taken as directed. · Tolerance-meaning you might need to take more of a medication for the same pain relief · Physical dependence-meaning you have symptoms of withdrawal when the medication is stopped. Withdrawal symptoms can include nausea, sweating, chills, diarrhea, stomach cramps, and muscle aches. Withdrawal can last up to several weeks, depending on which drug you took and how long you took it. · Increased sensitivity to pain · Constipation · Nausea, vomiting, and dry mouth · Sleepiness and dizziness · Confusion · Depression · Low levels of testosterone that can result in lower sex drive, energy, and strength · Itching and sweating RISKS ARE GREATER WITH:      
· History of drug misuse, substance use disorder, or overdose · Mental health conditions (such as depression or anxiety) · Sleep apnea · Older age (72 years or older) · Pregnancy Avoid alcohol while taking prescription opioids. Also, unless specifically advised by your health care provider, medications to avoid include: · Benzodiazepines (such as Xanax or Valium) · Muscle relaxants (such as Soma or Flexeril) · Hypnotics (such as Ambien or Lunesta) · Other prescription opioids KNOW YOUR OPTIONS Talk to your health care provider about ways to manage your pain that don't involve prescription opioids. Some of these options may actually work better and have fewer risks and side effects. Options may include: 
· Pain relievers such as acetaminophen, ibuprofen, and naproxen · Some medications that are also used for depression or seizures · Physical therapy and exercise · Counseling to help patients learn how to cope better with triggers of pain and stress. · Application of heat or cold compress · Massage therapy · Relaxation techniques Be Informed Make sure you know the name of your medication, how much and how often to take it, and its potential risks & side effects. IF YOU ARE PRESCRIBED OPIOIDS FOR PAIN: 
· Never take opioids in greater amounts or more often than prescribed. Remember the goal is not to be pain-free but to manage your pain at a tolerable level. · Follow up with your primary care provider to: · Work together to create a plan on how to manage your pain. · Talk about ways to help manage your pain that don't involve prescription opioids. · Talk about any and all concerns and side effects. · Help prevent misuse and abuse. · Never sell or share prescription opioids · Help prevent misuse and abuse. · Store prescription opioids in a secure place and out of reach of others (this may include visitors, children, friends, and family). · Safely dispose of unused/unwanted prescription opioids: Find your community drug take-back program or your pharmacy mail-back program, or flush them down the toilet, following guidance from the Food and Drug Administration (www.fda.gov/Drugs/ResourcesForYou). · Visit www.cdc.gov/drugoverdose to learn about the risks of opioid abuse and overdose. · If you believe you may be struggling with addiction, tell your health care provider and ask for guidance or call [employer] at 1-895-673-HSEG. Discharge Instructions R knee brace at all times No knee flexion until released to do so Call with questions or problems Aspirin 81mg twice daily for DVT prevention Call for Monday appointment for dressing change. TYPICAL SIDE EFFECTS OF PAIN MEDICATION: 
*    Constipation: Drink lots of fluids. Over the counter stool softener if needed. *    Nausea: Take pain medication with food. Call your doctor with persistent nausea. ACTIVITY · As tolerated and as directed by your doctor. · Bathe or shower as directed by your doctor. DIET 
· Day of surgery: Clear liquids until no nausea or vomiting; small portion, light diet [place] foods (ex: baked chicken, plain rice, grits, scrambled eggs, toast). Nothing greasy, fried or spicy today. · Advance to regular diet on second day, unless your doctor orders otherwise. · If nausea and vomiting continues, call your doctor. PAIN 
· Take pain medication as directed by your doctor. · DO NOT take aspirin or blood thinners unless directed by your doctor. CALL YOUR DOCTOR IF   
s Call your doctor if pain is NOT relieved by medication.  
s Excessive bleeding that does not stop after holding pressure over the area · Temperature of 101 degrees F or above · Excessive redness, swelling or bruising, and/ or green or yellow, smelly discharge from incision AFTER ANESTHESIA · For the first 24 hours: DO NOT Drive, Drink alcoholic beverages, or Make important decisions. · Be aware of dizziness following anesthesia and while taking pain medication. DISCHARGE SUMMARY from Nurse PATIENT INSTRUCTIONS: 
 
After general anesthesia or intravenous sedation, for 24 hours or while taking prescription Narcotics: · Limit your activities · Do not drive and operate hazardous machinery · Do not make important personal or business decisions · Do  not drink alcoholic beverages · If you have not urinated within 8 hours after discharge, please contact your surgeon on call. *  Please give a list of your current medications to your Primary Care Provider. *  Please update this list whenever your medications are discontinued, doses are 
    changed, or new medications (including over-the-counter products) are added. *  Please carry medication information at all times in case of emergency situations. Preventing Infection at Home We care about preventing infection and avoiding the spread of germs  not only when you are in the hospital but also when you return home. When you return home from the hospital, its important to take the following steps to help prevent infection and avoid spreading germs that could infect you and others. Ask everyone in your home to follow these guidelines, too. Clean Your Hands · Clean your hands whenever your hands are visibly dirty, before you eat, before or after touching your mouth, nose or eyes, and before preparing food. Clean them after contact with body fluids, using the restroom, touching animals or changing diapers. · When washing hands, wet them with warm water and work up a lather. Rub hands for at least 15 seconds, then rinse them and pat them dry with a clean towel or paper towel. · When using hand sanitizers, it should take about 15 seconds to rub your hands dry. If not, you probably didnt apply enough . Cover Your Sneeze or Cough Germs are released into the air whenever you sneeze or cough. To prevent the spread of infection: · Turn away from other people before coughing or sneezing. · Cover your mouth or nose with a tissue when you cough or sneeze. Put the tissue in the trash. · If you dont have a tissue, cough or sneeze into your upper sleeve, not your hands. · Always clean your hands after coughing or sneezing. Care for Wounds Your skin is your bodys first line of defense against germs, but an open wound leaves an easy way for germs to enter your body. To prevent infection: · Clean your hands before and after changing wound dressings, and wear gloves to change dressings if recommended by your doctor. · Take special care with IV lines or other devices inserted into the body. If you must touch them, clean your hands first. 
· Follow any specific instructions from your doctor to care for your wounds.  Contact your doctor if you experience any signs of infection, such as fever or increased redness at the surgical or wound site. Keep a [address] · Clean or wipe commonly touched hard surfaces like door handles, sinks, tabletops, phones and TV remotes. · Use products labeled disinfectant to kill harmful bacteria and viruses. · Use a clean cloth or paper towel to clean and dry surfaces. Wiping surfaces with a dirty dishcloth, sponge or towel will only spread germs. · Never share toothbrushes, griffith, drinking glasses, utensils, razor blades, face cloths or bath towels to avoid spreading germs. · Be sure that the linens that you sleep on are clean. · Keep pets away from wounds and wash your hands after touching pets, their toys or bedding. We care about you and your health. Remember, preventing infections is a team effort between you, your family, friends and health care providers. These are general instructions for a healthy lifestyle: No smoking/ No tobacco products/ Avoid exposure to second hand smoke Surgeon General's Warning:  Quitting smoking now greatly reduces serious risk to your health. Obesity, smoking, and sedentary lifestyle greatly increases your risk for illness A healthy diet, regular physical exercise & weight monitoring are important for maintaining a healthy lifestyle You may be retaining fluid if you have a history of heart failure or if you experience any of the following symptoms:  Weight gain of 3 pounds or more overnight or 5 pounds in a week, increased swelling in our hands or feet or shortness of breath while lying flat in bed. Please call your doctor as soon as you notice any of these symptoms; do not wait until your next office visit. Recognize signs and symptoms of STROKE: 
 
F-face looks uneven A-arms unable to move or move unevenly S-speech slurred or non-existent T-time-call 911 as soon as signs and symptoms begin-DO NOT go Back to bed or wait to see if you get better-TIME IS BRAIN. Introducing [facility]! Blaire Santoyo introduces [employer]teros patient portal. Now you can access parts of your medical record, email your doctor's office, and request medication refills online. 1. In your internet browser, go to [email or web address] 2. Click on the First Time User? Click Here link in the Sign In box. You will see the New Member Sign Up page. 3. Enter your Qteros Access Code exactly as it appears below. You will not need to use this code after youve completed the sign-up process. If you do not sign up before the expiration date, you must request a new code. · Qteros Access Code: [email or web address] Expires: 9/18/2018  2:54 PM 
 
4. Enter the last four digits of your Social Security Number (xxxx) and Date of Birth (mm/dd/yyyy) as indicated and click Submit. You will be taken to the next sign-up page. 5. Create a Qteros ID. This will be your Qteros login ID and cannot be changed, so think of one that is secure and easy to remember. 6. Create a Qteros password. You can change your password at any time. 7. Enter your Password Reset Question and Answer. This can be used at a later time if you forget your password. 8. Enter your e-mail address. You will receive e-mail notification when new information is available in [address] 9. Click Sign Up. You can now view and download portions of your medical record. 10. Click the Download Summary menu link to download a portable copy of your medical information. If you have questions, please visit the Frequently Asked Questions section of the Qteros website. Remember, Qteros is NOT to be used for urgent needs. For medical emergencies, dial 911. Now available from your iPhone and Android! Introducing Elder Lutz As a Blaire Giovannising patient, I wanted to make you aware of our electronic visit tool called Elder Lutz. Blaire Santoyo 24/7 allows you to connect within minutes with a medical provider 24 hours a day, seven days a week via a mobile device or tablet or logging into a secure website from your computer. You can access in2nite from anywhere in the [place]. A virtual visit might be right for you when you have a simple condition and feel like you just dont want to get out of bed, or cant get away from work for an appointment, when your regular [employer] provider is not available (evenings, weekends or holidays), or when youre out of town and need minor care. Electronic visits cost only $49 and if the [employer] 24/7 provider determines a prescription is needed to treat your condition, one can be electronically transmitted to a nearby pharmacy*. Please take a moment to enroll today if you have not already done so. The enrollment process is free and takes just a few minutes. To enroll, please download the [employer] 24/7 unique to your tablet or phone, or visit [email or web address] to enroll on your computer. And, as an [address] [place] [address] patient with a [employer] account, the results of your visits will be scanned into your electronic medical record and your primary care provider will be able to view the scanned results. We urge you to continue to see your regular [employer] provider for your ongoing medical care. And while your primary care provider may not be the one available when you seek a [employer] virtual visit, the peace of mind you get from getting a real diagnosis real time can be priceless. For more information on [employer], view our Frequently Asked Questions (FAQs) at [email or web address]. Sincerely, 
 
Majo Perez MD 
Chief Medical Officer [employer] *:  certain medications cannot be prescribed via [employer] Providers Seen During Your Hospitalization Provider Specialty Primary office phone Radha Amaral MD Orthopedic Surgery [phone] Your Primary Care Physician (PCP) Primary Care Physician Office Phone Office Fax Carl Number [phone] You are allergic to the following Allergen Reactions Keflex (Cephalexin) Diarrhea Recent Documentation Smoking Status Never Smoker Emergency Contacts Name Discharge Info Relation Home Work Mobile Arpita Tesfaye  Spouse [3] [phone] Patient Belongings The following personal items are in your possession at time of discharge: 
  Dental Appliances: None Please provide this summary of care documentation to your next provider. Signatures-by signing, you are acknowledging that this After Visit Summary has been reviewed with you and you have received a copy. Patient Signature:  ____________________________________________________________ Date:  ____________________________________________________________  
  
Lory Grey Provider Signature:  ____________________________________________________________ Date:  ____________________________________________________________

## 2018-08-23 ENCOUNTER — HOSPITAL ENCOUNTER (OUTPATIENT)
Dept: PHYSICAL THERAPY | Age: 58
Discharge: HOME | End: 2018-08-23
Payer: COMMERCIAL

## 2018-08-23 PROCEDURE — 97161 PT EVAL LOW COMPLEX 20 MIN: CPT

## 2018-08-23 PROCEDURE — 97110 THERAPEUTIC EXERCISES: CPT

## 2018-08-23 PROCEDURE — 97016 VASOPNEUMATIC DEVICE THERAPY: CPT

## 2018-08-23 NOTE — PROGRESS NOTES
Ambulatory/Rehab Services H2 Model Falls Risk Assessment    Risk Factor Pts. ·   Confusion/Disorientation/Impulsivity  []    4 ·   Symptomatic Depression  []   2 ·   Altered Elimination  []   1 ·   Dizziness/Vertigo  []   1 ·   Gender (Male)  [x]   1 ·   Any administered antiepileptics (anticonvulsants):  []   2 ·   Any administered benzodiazepines:  []   1 ·   Visual Impairment (specify):  []   1 ·   Portable Oxygen Use  []   1 ·   Orthostatic ? BP  []   1 ·   History of Recent Falls (within 3 mos.)  [x]   5     Ability to Rise from Chair (choose one) Pts. ·   Ability to rise in a single movement  []   0 ·   Pushes up, successful in one attempt  [x]   1 ·   Multiple attempts, but successful  []   3 ·   Unable to rise without assistance  []   4   Total: (5 or greater = High Risk) 7     Falls Prevention Plan:   []                Physical Limitations to Exercise (specify):   []                Mobility Assistance Device (type):   [x]                Exercise/Equipment Adaptation (specify): will supervise patient throughout session to ensure safety    ©2010 [facility] [address] [place] [address] States Patent #[id number].  Federal Law prohibits the replication, distribution or use without written permission from [employer]

## 2018-08-23 NOTE — THERAPY EVALUATION
Shaniterrell Foil  : 1960  Primary: Bridgette Otero Rpn  Secondary:  [address] at [address], [place], [address] [place] [address]  [phone]   [phone]         OUTPATIENT PHYSICAL THERAPY:Initial Assessment 2018    ICD-10: Treatment Diagnosis 1: strain of right quadriceps muscle, fascia and tendon, initial encounter (S76.11A)  Treatment Diagnosis 2: right knee pain (M25.561)  Treatment Diagnosis 3: gait dysfunction (R26.89)  Precautions: Falls risk due to fall causing injury - supervise patient with session  Allergies:   Keflex [cephalexin]   Fall Risk Score: 7 (? 5 = High Risk)  MD Orders: Evaluate and Treat  Physician Guideline: Knee flexion to 120 degrees by week 12, to 140 or more by week 16, eccentrics by week 16 MEDICAL/REFERRING DIAGNOSIS:  Strain of right quadriceps muscle, fascia and tendon, initial encounter [I51.774J]  Other reduced mobility [Z74.09]   DATE OF ONSET: 6/15/2018 when patient slipped and fell tearing his quad tendon, s/p repair of quad tendon 2018 with addition of Arthrex ArthroFlex patch  REFERRING PHYSICIAN: Sonal Phan MD  RETURN PHYSICIAN APPOINTMENT: 2018     INITIAL ASSESSMENT:  Mr. Mil Castellon is a 62 y.o. male presenting s/p right quad tendon repair on 2018 repaired with addition of Arthrex ArthroFlex patch after patient fell on 6/15/2018 when he slipped and fell tearing his quad tendon. He reported being in a full extension knee immobilizer since surgery and has been discharged from the brace recently. He reported swelling, stiffness, pain, and weakness of the knee. He is eager to return to exercising at the gym, golf, and activities with less pain and dysfunction. He is a good candidate for skilled interventions with services to include modalities as needed, manual therapy, therapeutic exercises, gait/stair/transfer/balance training, and return to gym/sport training.       PROBLEM LIST (Impacting functional limitations):  1. Decreased Strength  2. Decreased ADL/Functional Activities  3. Decreased Transfer Abilities  4. Decreased Ambulation Ability/Technique  5. Decreased Balance  6. Increased Pain  7. Decreased Activity Tolerance  8. Decreased Pacing Skills  9. Increased Fatigue  10. Increased Shortness of Breath  11. Decreased Flexibility/Joint Mobility  12. Edema/Girth INTERVENTIONS PLANNED:  1. Balance Exercise  2. Cold  3. Cryotherapy  4. Electrical Stimulation  5. Gait Training  6. Heat  7. Home Exercise Program (HEP)  8. Manual Therapy  9. Neuromuscular Re-education/Strengthening  10. Range of Motion (ROM)  11. Therapeutic Exercise/Strengthening  12. Transfer Training   TREATMENT PLAN:  Effective Dates: 8/23/2018 TO 11/15/2018. Frequency/Duration: 2 times a week for 12 weeks  GOALS: (Goals have been discussed and agreed upon with patient.)  Short-Term Functional Goals: Time Frame: 8/23/2018 to 10/4/2018  1. Patient demonstrates independence with home exercise program without verbal cueing provided by therapist.  2. Improve gait with decreased quad avoidance and improved heel to toe gait pattern. 3. Improve knee flexion ROM to 125 degrees by post operative week 12 in order to improve gait, transfers, and stairs. Discharge Goals: Time Frame: 8/23/2018 to 11/15/2018  1. Improve pain to 0-2/10 at the most with return to the gym for aerobic and weight training, walking for exercise, stairs, transfers, and standing for long periods of time. 2. Improve ROM to 0-140 degrees or more in order to normalize activities and return to exercise at the gym. 3. Improve strength of gross hip and lower extremity to at least 4+/5 in order to improve gait, stairs, transfers, and ability to exercise at the gym. 4. Improve stair ascend and descend with use of hand rail reciprocally with ascend and descend. 5. Improve transfers with use of hands 0% of the time into and out of standard height chair.   6. Return patient to exercising at the gym and weight lifting with a good understanding of exercise progression, technique, and form with exercises. 7. Improve Lower Extremity Functional Index score to 60/80 from 40/80. Rehabilitation Potential For Stated Goals: Good  Regarding Arcadio Tesfaye's therapy, I certify that the treatment plan above will be carried out by a therapist or under their direction. Thank you for this referral,  Seema Canales, PT, DPT, OCS    Referring Physician Signature: Magda Pérez MD              Date                    The information in this section was collected on 8/23/2018 (except where otherwise noted). HISTORY:   History of Present Injury/Illness (Reason for Referral):  Mr. Julio Hernández is a 62 y.o. male presenting s/p right quad tendon repair on 6/20/2018 repaired with addition of Arthrex ArthroFlex patch after patient fell on 6/15/2018 when he slipped and fell tearing his quad tendon. He reported being in a full extension knee immobilizer since surgery and has been discharged from the brace recently. He reported swelling, stiffness, pain, and weakness of the knee. He is eager to return to exercising at the gym, golf, and activities with less pain and dysfunction. Past Medical History/Comorbidities:   Mr. Julio Hernández  has a past medical history of Cancer ([address] Depression; and Hypercholesterolemia. Mr. Julio Hernández  has a past surgical history that includes hx tonsillectomy. Social History/Living Environment:     Patient lives with his wife and reports assistance from her with any painful activities. Prior Level of Function/Work/Activity:  Independent without dysfunction. Patient works in business management. He is also very active with exercising at the gym including aerobic machines and weight lifting. Patient is also an avid golfer.   Dominant Side:         RIGHT  Current Medications:     Current Outpatient Prescriptions:     aspirin delayed-release 81 mg tablet, Take 1 Tab by mouth every twelve (12) hours every twelve (12) hours. , Disp: 60 Tab, Rfl: 0    pregabalin (LYRICA) 75 mg capsule, Take 3 tabs po bid (Patient taking differently: Take 3 tabs po bid  Indications: Restless Legs Syndrome), Disp: 540 Cap, Rfl: 1    lisinopril (PRINIVIL, ZESTRIL) 10 mg tablet, TAKE 1 TABLET BY MOUTH DAILY (Patient taking differently: TAKE 1 TABLET BY MOUTH DAILY- pt takes once a week ( said if it makes me feel light headed to take it once a week)), Disp: 30 Tab, Rfl: 12    tamsulosin (FLOMAX) 0.4 mg capsule, TAKE 1 CAPSULE BY MOUTH DAILY, Disp: 30 Cap, Rfl: 12    lamoTRIgine (LAMICTAL) 25 mg tablet, Take 25 mg by mouth two (2) times a day., Disp: , Rfl:     venlafaxine-SR (EFFEXOR-XR) 75 mg capsule, Take 150 mg by mouth daily. , Disp: , Rfl:    Date Last Reviewed:  8/23/2018   Number of Personal Factors/Comorbidities that affect the Plan of Care: 0: LOW COMPLEXITY   EXAMINATION:     Observation/Postural and Gait Assessment: Patient ambulates without significant antalgia but he does exhibit quad avoidance gait with stance on the right. Visual significant atrophy of the right quadriceps compared the the left. Incision is completely approximated with scar tissue and no drainage or infection. Knee is visibly swollen on the right compared to the left with warmth noted of the knee compared to the left. Anthropometric measurements (cm) Left Right   Knee joint line       Palpation:  Gross tenderness to palpation of anterior knee joint. Scar is grossly mildly to moderately immobile. Flexibility is severely limited of gastrocnemius and moderately of hamstrings with SLR to 70 degrees. ROM:     AROM(PROM) Left Right   Knee flexion 153° 105°   Knee extension 4° knee flexion contracture 4° knee flexion contracture     Passive Accessory Mobility Testing: Gross mobility deficits in all directions of patellofemoral joint.     Strength:     Manual Muscle Test (out of 5) Left Right   Knee extension 5 3+, 0 degree lag with SLR, excellent quad set   Knee flexion 5 4   Hip flexion 5 4   Hip extension 4 3   Hip abduction 4 3   Ankle DF 5 4+   Ankle PF 5 4+     Special Tests:  Not tested due to acuity of surgery and symptoms. No signs or symptoms of infection or deep vein thrombosis at this time. Neurological Screen:  Myotomes: Key muscle strength testing for bilateral LE is WNL. Dermatomes: Sensation testing through bilateral LE for light touch is WNL. Functional Mobility:  Patient is generally safe and independent with most functional mobility but does require assistance with heavy lifting and carrying. Body Structures Involved:  1. Joints  2. Muscles Body Functions Affected:  1. Sensory/Pain  2. Neuromusculoskeletal Activities and Participation Affected:  1. General Tasks and Demands  2. Community, Social and [place]  3. Ability to exercise at the gym   Number of elements (examined above) that affect the Plan of Care: 3: MODERATE COMPLEXITY   CLINICAL PRESENTATION:   Presentation: Stable and uncomplicated: LOW COMPLEXITY   CLINICAL DECISION MAKING:   Outcome Measure: Tool Used: Lower Extremity Functional Scale (LEFS)  Score  Initial: 40/80 Most Recent: /80   Interpretation of Score: 20 questions each scored on a 5 point scale with 0 representing \"extreme difficulty or unable to perform\" and 4 representing \"no difficulty\". The lower the score, the greater the functional disability. 80/80 represents no disability. Minimal detectable change is 9 points. Medical Necessity:   · Patient is expected to demonstrate progress in strength, range of motion, balance and coordination to improve tolerance to exercising at the gym and playing golf. · Skilled intervention continues to be required due to weakness, decreased ROM, decreased flexibility, pain, and functional limitations. Reason for Services/Other Comments:  · Patient continues to require skilled intervention due to increasing complexity of exercises.    Use of outcome tool(s) and clinical judgement create a POC that gives a: Clear prediction of patient's progress: LOW COMPLEXITY            TREATMENT:   (In addition to Assessment/Re-Assessment sessions the following treatments were rendered)    Pre-treatment Symptoms/Complaints:  Patient reported his knee has not been painful but very stiff and sore. Came out of brace last week. Pain: Initial:   Pain Intensity 1: 10  Pain Location 1: Knee  Pain Orientation 1: Right  Post Session:  0/10      Therapeutic Exercise: (15 Minutes):  Exercises per grid below to improve mobility, strength, balance and coordination. Required minimal visual, verbal and manual cues to promote proper body alignment, promote proper body posture, promote proper body mechanics and promote proper body breathing techniques. Progressed resistance, range, repetitions and complexity of movement as indicated.     (used abbreviations: BET-back education training) Date:  8/23/2018 Date:   Date:     Activity/Exercise Parameters Parameters Parameters   Nustep Next session please     Mini squats Next session please     Step ups - 4 inch Next session please     Calf stretch Strap 3 x 30 sec     Hamstring stretch Strap 3 x 30 sec     Short arc quad 2 x 10     Straight leg raise flexion 2 x 10     Sidelying hip abduction 2 x 10     Prone hip extension 2 x 10     Long arc quad 2 x 10     Standing heel raise 2 x 10     Sit to stand Off plinth - elevated higher - 2 x 10 with hands     Supine heel slides Active only - 5 sec x 10               Manual Therapy (     ): improve joint and soft tissue mobility  · With evaluation - instruction for soft tissue mobilization of incision by therapist and with instruction of patient for self mobilization  (Used abbreviations: MET - muscle energy technique; PNF - proprioceptive neuromuscular facilitation; NMR - neuromuscular re-education; a/p - anterior to posterior; p/a - posterior to anterior; NT - not tested)    Therapeutic Modalities: for edema and pain                      Right Knee Cold  Type:  (vasopneumatic)  Duration: 15 minutes  Patient Position: Supine                                                                        HEP: As above; handouts given to patient for all exercises. Treatment/Session Assessment:    · Response to Treatment:  Patient reported no complaints with exercises other than some minor sharp pain with sit to stand so this exercise was elevated higher on the plinth pain free. Will progress with very gradual increase in knee flexion ROM to 125 degrees by week 12 post operative. Please refer to physician guideline in chart. Patient reported a good understanding of HEP. Please take a circumferential swelling measurement of the knees and log in objective measures. Swelling visually improved at the end of session. · Compliance with Program/Exercises: compliant most of the time. · Recommendations/Intent for next treatment session: \"Next visit will focus on advancements to more challenging activities\".     Total Treatment Duration: 60 minutes; 30 evaluation, 15 exercises, 15 vasopneumatic  PT Patient Time In/Time Out  Time In: 1105  Time Out: [address] [facility] Mirian Greenfield

## 2018-08-30 ENCOUNTER — APPOINTMENT (OUTPATIENT)
Dept: PHYSICAL THERAPY | Age: 58
End: 2018-08-30

## 2018-08-30 ENCOUNTER — HOSPITAL ENCOUNTER (OUTPATIENT)
Dept: PHYSICAL THERAPY | Age: 58
Discharge: HOME | End: 2018-08-30
Payer: COMMERCIAL

## 2018-08-30 PROCEDURE — 97140 MANUAL THERAPY 1/> REGIONS: CPT

## 2018-08-30 PROCEDURE — 97016 VASOPNEUMATIC DEVICE THERAPY: CPT

## 2018-08-30 PROCEDURE — 97110 THERAPEUTIC EXERCISES: CPT

## 2018-08-30 NOTE — PROGRESS NOTES
Viktoria Velasco  : 1960  Primary: Scott Otero Rpn  Secondary:  [id number] at [address], [place], [address] [place] [address]  [phone]   [phone]         OUTPATIENT PHYSICAL THERAPY:Daily Note 2018    ICD-10: Treatment Diagnosis 1: strain of right quadriceps muscle, fascia and tendon, initial encounter (S76.11A)  Treatment Diagnosis 2: right knee pain (M25.561)  Treatment Diagnosis 3: gait dysfunction (R26.89)  Precautions: Falls risk due to fall causing injury - supervise patient with session  Allergies:   Keflex [cephalexin]   Fall Risk Score: 7 (? 5 = High Risk)  MD Orders: Evaluate and Treat  Physician Guideline: Knee flexion to 120 degrees by week 12, to 140 or more by week 16, eccentrics by week 16 MEDICAL/REFERRING DIAGNOSIS:  Strain of right quadriceps muscle, fascia and tendon, initial encounter [N92.142F]  Other reduced mobility [Z74.09]   DATE OF ONSET: 6/15/2018 when patient slipped and fell tearing his quad tendon, s/p repair of quad tendon 2018 with addition of Arthrex ArthroFlex patch  REFERRING PHYSICIAN: Emmanuel Herrera MD  RETURN PHYSICIAN APPOINTMENT: 2018     INITIAL ASSESSMENT:  Mr. Royce Millard is a 62 y.o. male presenting s/p right quad tendon repair on 2018 repaired with addition of Arthrex ArthroFlex patch after patient fell on 6/15/2018 when he slipped and fell tearing his quad tendon. He reported being in a full extension knee immobilizer since surgery and has been discharged from the brace recently. He reported swelling, stiffness, pain, and weakness of the knee. He is eager to return to exercising at the gym, golf, and activities with less pain and dysfunction. He is a good candidate for skilled interventions with services to include modalities as needed, manual therapy, therapeutic exercises, gait/stair/transfer/balance training, and return to gym/sport training.       PROBLEM LIST (Impacting functional limitations):  1. Decreased Strength  2. Decreased ADL/Functional Activities  3. Decreased Transfer Abilities  4. Decreased Ambulation Ability/Technique  5. Decreased Balance  6. Increased Pain  7. Decreased Activity Tolerance  8. Decreased Pacing Skills  9. Increased Fatigue  10. Increased Shortness of Breath  11. Decreased Flexibility/Joint Mobility  12. Edema/Girth INTERVENTIONS PLANNED:  1. Balance Exercise  2. Cold  3. Cryotherapy  4. Electrical Stimulation  5. Gait Training  6. Heat  7. Home Exercise Program (HEP)  8. Manual Therapy  9. Neuromuscular Re-education/Strengthening  10. Range of Motion (ROM)  11. Therapeutic Exercise/Strengthening  12. Transfer Training   TREATMENT PLAN:  Effective Dates: 8/23/2018 TO 11/15/2018. Frequency/Duration: 2 times a week for 12 weeks  GOALS: (Goals have been discussed and agreed upon with patient.)  Short-Term Functional Goals: Time Frame: 8/23/2018 to 10/4/2018  1. Patient demonstrates independence with home exercise program without verbal cueing provided by therapist.  2. Improve gait with decreased quad avoidance and improved heel to toe gait pattern. 3. Improve knee flexion ROM to 125 degrees by post operative week 12 in order to improve gait, transfers, and stairs. Discharge Goals: Time Frame: 8/23/2018 to 11/15/2018  1. Improve pain to 0-2/10 at the most with return to the gym for aerobic and weight training, walking for exercise, stairs, transfers, and standing for long periods of time. 2. Improve ROM to 0-140 degrees or more in order to normalize activities and return to exercise at the gym. 3. Improve strength of gross hip and lower extremity to at least 4+/5 in order to improve gait, stairs, transfers, and ability to exercise at the gym. 4. Improve stair ascend and descend with use of hand rail reciprocally with ascend and descend. 5. Improve transfers with use of hands 0% of the time into and out of standard height chair.   6. Return patient to exercising at the gym and weight lifting with a good understanding of exercise progression, technique, and form with exercises. 7. Improve Lower Extremity Functional Index score to 60/80 from 40/80. Rehabilitation Potential For Stated Goals: Good  Regarding Olivia Tesfaye's therapy, I certify that the treatment plan above will be carried out by a therapist or under their direction. Thank you for this referral,  Nereida Carr, PT, DPT, OCS    Referring Physician Signature: Mendel Rho, MD              Date                    The information in this section was collected on 8/23/2018 (except where otherwise noted). HISTORY:   History of Present Injury/Illness (Reason for Referral):  Mr. Ulysses Brookes is a 62 y.o. male presenting s/p right quad tendon repair on 6/20/2018 repaired with addition of Arthrex ArthroFlex patch after patient fell on 6/15/2018 when he slipped and fell tearing his quad tendon. He reported being in a full extension knee immobilizer since surgery and has been discharged from the brace recently. He reported swelling, stiffness, pain, and weakness of the knee. He is eager to return to exercising at the gym, golf, and activities with less pain and dysfunction. Past Medical History/Comorbidities:   Mr. Ulysses Brookes  has a past medical history of Cancer ([address] Depression; and Hypercholesterolemia. Mr. Ulysses Brookes  has a past surgical history that includes hx tonsillectomy. Social History/Living Environment:     Patient lives with his wife and reports assistance from her with any painful activities. Prior Level of Function/Work/Activity:  Independent without dysfunction. Patient works in business management. He is also very active with exercising at the gym including aerobic machines and weight lifting. Patient is also an avid golfer.   Dominant Side:         RIGHT  Current Medications:     Current Outpatient Prescriptions:     aspirin delayed-release 81 mg tablet, Take 1 Tab by mouth every twelve (12) hours every twelve (12) hours. , Disp: 60 Tab, Rfl: 0    pregabalin (LYRICA) 75 mg capsule, Take 3 tabs po bid (Patient taking differently: Take 3 tabs po bid  Indications: Restless Legs Syndrome), Disp: 540 Cap, Rfl: 1    lisinopril (PRINIVIL, ZESTRIL) 10 mg tablet, TAKE 1 TABLET BY MOUTH DAILY (Patient taking differently: TAKE 1 TABLET BY MOUTH DAILY- pt takes once a week ( said if it makes me feel light headed to take it once a week)), Disp: 30 Tab, Rfl: 12    tamsulosin (FLOMAX) 0.4 mg capsule, TAKE 1 CAPSULE BY MOUTH DAILY, Disp: 30 Cap, Rfl: 12    lamoTRIgine (LAMICTAL) 25 mg tablet, Take 25 mg by mouth two (2) times a day., Disp: , Rfl:     venlafaxine-SR (EFFEXOR-XR) 75 mg capsule, Take 150 mg by mouth daily. , Disp: , Rfl:    Date Last Reviewed:  8/30/2018   Number of Personal Factors/Comorbidities that affect the Plan of Care: 0: LOW COMPLEXITY   EXAMINATION:     Observation/Postural and Gait Assessment: Patient ambulates without significant antalgia but he does exhibit quad avoidance gait with stance on the right. Visual significant atrophy of the right quadriceps compared the the left. Incision is completely approximated with scar tissue and no drainage or infection. Knee is visibly swollen on the right compared to the left with warmth noted of the knee compared to the left. Anthropometric measurements (cm) Left Right   Knee joint line 40cm 40cm     Palpation:  Gross tenderness to palpation of anterior knee joint. Scar is grossly mildly to moderately immobile. Flexibility is severely limited of gastrocnemius and moderately of hamstrings with SLR to 70 degrees. ROM:     AROM(PROM) Left Right   Knee flexion 153° 112° (as of 8/30/18)   Knee extension 4° knee flexion contracture 4° knee flexion contracture     Passive Accessory Mobility Testing: Gross mobility deficits in all directions of patellofemoral joint.     Strength:     Manual Muscle Test (out of 5) Left Right   Knee extension 5 3+, 0 degree lag with SLR, excellent quad set   Knee flexion 5 4   Hip flexion 5 4   Hip extension 4 3   Hip abduction 4 3   Ankle DF 5 4+   Ankle PF 5 4+     Special Tests:  Not tested due to acuity of surgery and symptoms. No signs or symptoms of infection or deep vein thrombosis at this time. Neurological Screen:  Myotomes: Key muscle strength testing for bilateral LE is WNL. Dermatomes: Sensation testing through bilateral LE for light touch is WNL. Functional Mobility:  Patient is generally safe and independent with most functional mobility but does require assistance with heavy lifting and carrying. Body Structures Involved:  1. Joints  2. Muscles Body Functions Affected:  1. Sensory/Pain  2. Neuromusculoskeletal Activities and Participation Affected:  1. General Tasks and Demands  2. Community, Social and [place]  3. Ability to exercise at the gym   Number of elements (examined above) that affect the Plan of Care: 3: MODERATE COMPLEXITY   CLINICAL PRESENTATION:   Presentation: Stable and uncomplicated: LOW COMPLEXITY   CLINICAL DECISION MAKING:   Outcome Measure: Tool Used: Lower Extremity Functional Scale (LEFS)  Score  Initial: 40/80 Most Recent: /80   Interpretation of Score: 20 questions each scored on a 5 point scale with 0 representing \"extreme difficulty or unable to perform\" and 4 representing \"no difficulty\". The lower the score, the greater the functional disability. 80/80 represents no disability. Minimal detectable change is 9 points. Medical Necessity:   · Patient is expected to demonstrate progress in strength, range of motion, balance and coordination to improve tolerance to exercising at the gym and playing golf. · Skilled intervention continues to be required due to weakness, decreased ROM, decreased flexibility, pain, and functional limitations.   Reason for Services/Other Comments:  · Patient continues to require skilled intervention due to increasing complexity of exercises. Use of outcome tool(s) and clinical judgement create a POC that gives a: Clear prediction of patient's progress: LOW COMPLEXITY            TREATMENT:   (In addition to Assessment/Re-Assessment sessions the following treatments were rendered)    Pre-treatment Symptoms/Complaints:  Patient reports no pain this morning and no issues with home program.  Patient reports mild pain with ascending/descending stairs  Pain: Initial:   Pain Intensity 1: 0  Pain Location 1: Knee  Pain Orientation 1: Right  Post Session:  0/10      Therapeutic Exercise: (40 Minutes):  Exercises per grid below to improve mobility, strength, balance and coordination. Required minimal visual, verbal and manual cues to promote proper body alignment, promote proper body posture, promote proper body mechanics and promote proper body breathing techniques. Progressed resistance, range, repetitions and complexity of movement as indicated.     (used abbreviations: BET-back education training) Date:  8/23/2018 Date:  8/30/18 Date:     Activity/Exercise Parameters Parameters Parameters   Nustep Next session please Level 3, 8 min, legs only    Mini squats Next session please 2x10, standing at door    Step ups - 4 inch Next session please 2x10, right    Calf stretch Strap 3 x 30 sec [id number], incline    Hamstring stretch Strap 3 x 30 sec [id number], supine    Short arc quad 2 x 10 2x10, 5 sec holds    Straight leg raise flexion 2 x 10 2x10    Sidelying hip abduction 2 x 10 2x10    Prone hip extension 2 x 10 2x10    Long arc quad 2 x 10     Standing heel raise 2 x 10 2x10    Sit to stand Off plinth - elevated higher - 2 x 10 with hands 2x5, no hands, chair with 2 airex    Supine heel slides Active only - 5 sec x 10 [id number], active, supine              Manual Therapy (    Soft Tissue Mobilization Duration  Duration: 10 Minutes): improve joint and soft tissue mobility   soft tissue mobilization of incision to decrease scar tissue adhesion  (Used abbreviations: MET - muscle energy technique; PNF - proprioceptive neuromuscular facilitation; NMR - neuromuscular re-education; a/p - anterior to posterior; p/a - posterior to anterior; NT - not tested)    Therapeutic Modalities: for edema and pain                      Right Knee Cold  Type:  (vaso pneumatic)  Duration: 10 minutes  Patient Position: Supine                                                                        HEP: As above; handouts given to patient for all exercises. Treatment/Session Assessment:    · Response to Treatment:  Patient reported no pain with exercises but mild discomfort with weight bearing squatting exercises. Patient increased active knee flexion to 112 degrees in supine. Patient presents with no swelling at joint line but mild swelling at incision site. · Compliance with Program/Exercises: compliant most of the time. · Recommendations/Intent for next treatment session: \"Next visit will focus on advancements to more challenging activities\".     Total Treatment Duration: 60 minutes  PT Patient Time In/Time Out  Time In: 0700  Time Out: 0800    Layne Samaniego PT

## 2018-08-31 ENCOUNTER — HOSPITAL ENCOUNTER (OUTPATIENT)
Dept: PHYSICAL THERAPY | Age: 58
Discharge: HOME | End: 2018-08-31
Payer: COMMERCIAL

## 2018-09-06 ENCOUNTER — HOSPITAL ENCOUNTER (OUTPATIENT)
Dept: PHYSICAL THERAPY | Age: 58
Discharge: HOME | End: 2018-09-06
Payer: COMMERCIAL

## 2018-09-06 PROCEDURE — 97140 MANUAL THERAPY 1/> REGIONS: CPT

## 2018-09-06 PROCEDURE — 97110 THERAPEUTIC EXERCISES: CPT

## 2018-09-06 NOTE — PROGRESS NOTES
Shaniricharde Foil  : 1960  Primary: Bridgette Otero Rpn  Secondary:  [address] at [address], [place], [address] [place] [address]  [phone]   [phone]         OUTPATIENT PHYSICAL THERAPY:Daily Note 2018    ICD-10: Treatment Diagnosis 1: strain of right quadriceps muscle, fascia and tendon, initial encounter (S76.11A)  Treatment Diagnosis 2: right knee pain (M25.561)  Treatment Diagnosis 3: gait dysfunction (R26.89)  Precautions: Falls risk due to fall causing injury - supervise patient with session  Allergies:   Keflex [cephalexin]   Fall Risk Score: 7 (? 5 = High Risk)  MD Orders: Evaluate and Treat  Physician Guideline: Knee flexion to 120 degrees by week 12, to 140 or more by week 16, eccentrics by week 16 MEDICAL/REFERRING DIAGNOSIS:  Strain of right quadriceps muscle, fascia and tendon, initial encounter [F34.363E]  Other reduced mobility [Z74.09]   DATE OF ONSET: 6/15/2018 when patient slipped and fell tearing his quad tendon, s/p repair of quad tendon 2018 with addition of Arthrex ArthroFlex patch  REFERRING PHYSICIAN: Sonal Phan MD  RETURN PHYSICIAN APPOINTMENT: 2018     INITIAL ASSESSMENT:  Mr. Mil Castellon is a 62 y.o. male presenting s/p right quad tendon repair on 2018 repaired with addition of Arthrex ArthroFlex patch after patient fell on 6/15/2018 when he slipped and fell tearing his quad tendon. He reported being in a full extension knee immobilizer since surgery and has been discharged from the brace recently. He reported swelling, stiffness, pain, and weakness of the knee. He is eager to return to exercising at the gym, golf, and activities with less pain and dysfunction. He is a good candidate for skilled interventions with services to include modalities as needed, manual therapy, therapeutic exercises, gait/stair/transfer/balance training, and return to gym/sport training.       PROBLEM LIST (Impacting functional limitations):  1. Decreased Strength  2. Decreased ADL/Functional Activities  3. Decreased Transfer Abilities  4. Decreased Ambulation Ability/Technique  5. Decreased Balance  6. Increased Pain  7. Decreased Activity Tolerance  8. Decreased Pacing Skills  9. Increased Fatigue  10. Increased Shortness of Breath  11. Decreased Flexibility/Joint Mobility  12. Edema/Girth INTERVENTIONS PLANNED:  1. Balance Exercise  2. Cold  3. Cryotherapy  4. Electrical Stimulation  5. Gait Training  6. Heat  7. Home Exercise Program (HEP)  8. Manual Therapy  9. Neuromuscular Re-education/Strengthening  10. Range of Motion (ROM)  11. Therapeutic Exercise/Strengthening  12. Transfer Training   TREATMENT PLAN:  Effective Dates: 8/23/2018 TO 11/15/2018. Frequency/Duration: 2 times a week for 12 weeks  GOALS: (Goals have been discussed and agreed upon with patient.)  Short-Term Functional Goals: Time Frame: 8/23/2018 to 10/4/2018  1. Patient demonstrates independence with home exercise program without verbal cueing provided by therapist.  2. Improve gait with decreased quad avoidance and improved heel to toe gait pattern. 3. Improve knee flexion ROM to 125 degrees by post operative week 12 in order to improve gait, transfers, and stairs. Discharge Goals: Time Frame: 8/23/2018 to 11/15/2018  1. Improve pain to 0-2/10 at the most with return to the gym for aerobic and weight training, walking for exercise, stairs, transfers, and standing for long periods of time. 2. Improve ROM to 0-140 degrees or more in order to normalize activities and return to exercise at the gym. 3. Improve strength of gross hip and lower extremity to at least 4+/5 in order to improve gait, stairs, transfers, and ability to exercise at the gym. 4. Improve stair ascend and descend with use of hand rail reciprocally with ascend and descend. 5. Improve transfers with use of hands 0% of the time into and out of standard height chair.   6. Return patient to exercising at the gym and weight lifting with a good understanding of exercise progression, technique, and form with exercises. 7. Improve Lower Extremity Functional Index score to 60/80 from 40/80. Rehabilitation Potential For Stated Goals: Good  Regarding Olivia Tesfaye's therapy, I certify that the treatment plan above will be carried out by a therapist or under their direction. Thank you for this referral,  Nereida Carr, PT, DPT, OCS    Referring Physician Signature: Mendel Rho, MD              Date                    The information in this section was collected on 8/23/2018 (except where otherwise noted). HISTORY:   History of Present Injury/Illness (Reason for Referral):  Mr. Ulysses Brookes is a 62 y.o. male presenting s/p right quad tendon repair on 6/20/2018 repaired with addition of Arthrex ArthroFlex patch after patient fell on 6/15/2018 when he slipped and fell tearing his quad tendon. He reported being in a full extension knee immobilizer since surgery and has been discharged from the brace recently. He reported swelling, stiffness, pain, and weakness of the knee. He is eager to return to exercising at the gym, golf, and activities with less pain and dysfunction. Past Medical History/Comorbidities:   Mr. Ulysses Brookes  has a past medical history of Cancer ([address] Depression; and Hypercholesterolemia. Mr. Ulysses Brookes  has a past surgical history that includes hx tonsillectomy. Social History/Living Environment:     Patient lives with his wife and reports assistance from her with any painful activities. Prior Level of Function/Work/Activity:  Independent without dysfunction. Patient works in business management. He is also very active with exercising at the gym including aerobic machines and weight lifting. Patient is also an avid golfer.   Dominant Side:         RIGHT  Current Medications:     Current Outpatient Prescriptions:     aspirin delayed-release 81 mg tablet, Take 1 Tab by mouth every twelve (12) hours every twelve (12) hours. , Disp: 60 Tab, Rfl: 0    pregabalin (LYRICA) 75 mg capsule, Take 3 tabs po bid (Patient taking differently: Take 3 tabs po bid  Indications: Restless Legs Syndrome), Disp: 540 Cap, Rfl: 1    lisinopril (PRINIVIL, ZESTRIL) 10 mg tablet, TAKE 1 TABLET BY MOUTH DAILY (Patient taking differently: TAKE 1 TABLET BY MOUTH DAILY- pt takes once a week ( said if it makes me feel light headed to take it once a week)), Disp: 30 Tab, Rfl: 12    tamsulosin (FLOMAX) 0.4 mg capsule, TAKE 1 CAPSULE BY MOUTH DAILY, Disp: 30 Cap, Rfl: 12    lamoTRIgine (LAMICTAL) 25 mg tablet, Take 25 mg by mouth two (2) times a day., Disp: , Rfl:     venlafaxine-SR (EFFEXOR-XR) 75 mg capsule, Take 150 mg by mouth daily. , Disp: , Rfl:    Date Last Reviewed:  9/6/2018   Number of Personal Factors/Comorbidities that affect the Plan of Care: 0: LOW COMPLEXITY   EXAMINATION:     Observation/Postural and Gait Assessment: Patient ambulates without significant antalgia but he does exhibit quad avoidance gait with stance on the right. Visual significant atrophy of the right quadriceps compared the the left. Incision is completely approximated with scar tissue and no drainage or infection. Knee is visibly swollen on the right compared to the left with warmth noted of the knee compared to the left. Anthropometric measurements (cm) Left Right   Knee joint line 40cm 40cm     Palpation:  Gross tenderness to palpation of anterior knee joint. Scar is grossly mildly to moderately immobile. Flexibility is severely limited of gastrocnemius and moderately of hamstrings with SLR to 70 degrees. ROM:     AROM(PROM) Left Right   Knee flexion 153° 118° (as of 9/6/18)   Knee extension 4° knee flexion contracture 4° knee flexion contracture     Passive Accessory Mobility Testing: Gross mobility deficits in all directions of patellofemoral joint.     Strength:     Manual Muscle Test (out of 5) Left Right   Knee extension 5 3+, 0 degree lag with SLR, excellent quad set   Knee flexion 5 4   Hip flexion 5 4   Hip extension 4 3   Hip abduction 4 3   Ankle DF 5 4+   Ankle PF 5 4+     Special Tests:  Not tested due to acuity of surgery and symptoms. No signs or symptoms of infection or deep vein thrombosis at this time. Neurological Screen:  Myotomes: Key muscle strength testing for bilateral LE is WNL. Dermatomes: Sensation testing through bilateral LE for light touch is WNL. Functional Mobility:  Patient is generally safe and independent with most functional mobility but does require assistance with heavy lifting and carrying. Body Structures Involved:  1. Joints  2. Muscles Body Functions Affected:  1. Sensory/Pain  2. Neuromusculoskeletal Activities and Participation Affected:  1. General Tasks and Demands  2. Community, Social and [place]  3. Ability to exercise at the gym   Number of elements (examined above) that affect the Plan of Care: 3: MODERATE COMPLEXITY   CLINICAL PRESENTATION:   Presentation: Stable and uncomplicated: LOW COMPLEXITY   CLINICAL DECISION MAKING:   Outcome Measure: Tool Used: Lower Extremity Functional Scale (LEFS)  Score  Initial: 40/80 Most Recent: /80   Interpretation of Score: 20 questions each scored on a 5 point scale with 0 representing \"extreme difficulty or unable to perform\" and 4 representing \"no difficulty\". The lower the score, the greater the functional disability. 80/80 represents no disability. Minimal detectable change is 9 points. Medical Necessity:   · Patient is expected to demonstrate progress in strength, range of motion, balance and coordination to improve tolerance to exercising at the gym and playing golf. · Skilled intervention continues to be required due to weakness, decreased ROM, decreased flexibility, pain, and functional limitations.   Reason for Services/Other Comments:  · Patient continues to require skilled intervention due to increasing complexity of exercises. Use of outcome tool(s) and clinical judgement create a POC that gives a: Clear prediction of patient's progress: LOW COMPLEXITY            TREATMENT:   (In addition to Assessment/Re-Assessment sessions the following treatments were rendered)    Pre-treatment Symptoms/Complaints:  Patient reports minimal pain this morning, but reported moderate to marked pain/aching yesterday with ADL's. Pain: Initial:   Pain Intensity 1: 2  Pain Location 1: Knee  Pain Orientation 1: Right  Post Session:  0/10      Therapeutic Exercise: (45 Minutes):  Exercises per grid below to improve mobility, strength, balance and coordination. Required minimal visual, verbal and manual cues to promote proper body alignment, promote proper body posture, promote proper body mechanics and promote proper body breathing techniques. Progressed resistance, range, repetitions and complexity of movement as indicated.     (used abbreviations: BET-back education training) Date:  8/23/2018 Date:  8/30/18 Date:  9/6/18   Activity/Exercise Parameters Parameters Parameters   Nustep Next session please Level 3, 8 min, legs only Level 4, 10 min, legs only   Mini squats Next session please 2x10, standing at door 3x10, standing at door   Step ups  Next session please 2x10, right, 4 in 3x10, right, 6 inch   Calf stretch Strap 3 x 30 sec [id number], incline [id number], incline   Hamstring stretch Strap 3 x 30 sec [id number], supine [id number]   Short arc quad 2 x 10 2x10, 5 sec holds    Straight leg raise flexion 2 x 10 2x10 2x10, 2lb   Sidelying hip abduction 2 x 10 2x10 2x10, 2lb   Prone hip extension 2 x 10 2x10 2x10, 2lb   Long arc quad 2 x 10  1x15   Standing heel raise 2 x 10 2x10 2x15   Sit to stand Off plinth - elevated higher - 2 x 10 with hands 2x5, no hands, chair with 2 airex 3x5, no hands, chair with one airex   Supine heel slides Active only - 5 sec x 10 [id number], active, supine [id number], active             Manual Therapy (    Soft Tissue Mobilization Duration  Duration: 10 Minutes): improve joint and soft tissue mobility   soft tissue mobilization of incision to decrease scar tissue adhesion  (Used abbreviations: MET - muscle energy technique; PNF - proprioceptive neuromuscular facilitation; NMR - neuromuscular re-education; a/p - anterior to posterior; p/a - posterior to anterior; NT - not tested)    Therapeutic Modalities: for edema and pain                                                                                               HEP: As above; handouts given to patient for all exercises. Treatment/Session Assessment:    · Response to Treatment:  Patient continues to tolerate and progress all exercises with no complaints. Patient increased AROM flexion to 118 degrees. · Compliance with Program/Exercises: compliant most of the time. · Recommendations/Intent for next treatment session: \"Next visit will focus on advancements to more challenging activities\".     Total Treatment Duration: 55 minutes  PT Patient Time In/Time Out  Time In: 0700  Time Out: 32 Ahmet Marte, PT

## 2018-09-07 ENCOUNTER — HOSPITAL ENCOUNTER (OUTPATIENT)
Dept: PHYSICAL THERAPY | Age: 58
Discharge: HOME | End: 2018-09-07
Payer: COMMERCIAL

## 2018-09-12 ENCOUNTER — HOSPITAL ENCOUNTER (OUTPATIENT)
Dept: PHYSICAL THERAPY | Age: 58
Discharge: HOME | End: 2018-09-12
Payer: COMMERCIAL

## 2018-09-14 ENCOUNTER — HOSPITAL ENCOUNTER (OUTPATIENT)
Dept: PHYSICAL THERAPY | Age: 58
Discharge: HOME | End: 2018-09-14
Payer: COMMERCIAL

## 2018-09-19 ENCOUNTER — HOSPITAL ENCOUNTER (OUTPATIENT)
Dept: PHYSICAL THERAPY | Age: 58
Discharge: HOME | End: 2018-09-19
Payer: COMMERCIAL

## 2018-09-21 ENCOUNTER — HOSPITAL ENCOUNTER (OUTPATIENT)
Dept: PHYSICAL THERAPY | Age: 58
Discharge: HOME | End: 2018-09-21
Payer: COMMERCIAL

## 2018-09-26 ENCOUNTER — HOSPITAL ENCOUNTER (OUTPATIENT)
Dept: PHYSICAL THERAPY | Age: 58
Discharge: HOME | End: 2018-09-26
Payer: COMMERCIAL

## 2018-09-26 PROCEDURE — 97110 THERAPEUTIC EXERCISES: CPT

## 2018-09-26 NOTE — PROGRESS NOTES
Breanna Ramirez  : 1960  Primary: Tanvir Villelabeverley Otero Rpn  Secondary:  [address] at [place] [address], Zane mckeon, [address]  [phone]   [phone]         OUTPATIENT PHYSICAL THERAPY:Daily Note and Progress Report 2018    ICD-10: Treatment Diagnosis 1: strain of right quadriceps muscle, fascia and tendon, initial encounter (S76.11A)  Treatment Diagnosis 2: right knee pain (M25.561)  Treatment Diagnosis 3: gait dysfunction (R26.89)  Precautions: Falls risk due to fall causing injury - supervise patient with session  Allergies:   Keflex [cephalexin]   Fall Risk Score: 7 (? 5 = High Risk)  MD Orders: Evaluate and Treat  Physician Guideline: Knee flexion to 120 degrees by week 12, to 140 or more by week 16, eccentrics by week 16 MEDICAL/REFERRING DIAGNOSIS:  Strain of right quadriceps muscle, fascia and tendon, initial encounter [R04.112N]  Other reduced mobility [Z74.09]   DATE OF ONSET: 6/15/2018 when patient slipped and fell tearing his quad tendon, s/p repair of quad tendon 2018 with addition of Arthrex ArthroFlex patch  REFERRING PHYSICIAN: Zaria Simon MD  RETURN PHYSICIAN APPOINTMENT: 2018     INITIAL ASSESSMENT:  Mr. Lena Suarez is a 62 y.o. male presenting s/p right quad tendon repair on 2018 repaired with addition of Arthrex ArthroFlex patch after patient fell on 6/15/2018 when he slipped and fell tearing his quad tendon. He reported being in a full extension knee immobilizer since surgery and has been discharged from the brace recently. He reported swelling, stiffness, pain, and weakness of the knee. He is eager to return to exercising at the gym, golf, and activities with less pain and dysfunction. Patient has been seen for 4 sessions from 18 to 18. Patient has missed multiple sessions due to work travel but continues to make good progress towards all goals.   Patient continues to progress range of motion in right knee and continues to increase both intensity and volume with lower extremity strengthening program.  Patient reports no limitations with ADL's but continues to report varied episodes of joint pain in right knee, but no pain in right quadriceps or along incision. He is a good candidate for continued skilled interventions with services to include modalities as needed, manual therapy, therapeutic exercises, gait/stair/transfer/balance training, and return to gym/sport training. PROBLEM LIST (Impacting functional limitations):  1. Decreased Strength  2. Decreased ADL/Functional Activities  3. Decreased Transfer Abilities  4. Decreased Ambulation Ability/Technique  5. Decreased Balance  6. Increased Pain  7. Decreased Activity Tolerance  8. Decreased Pacing Skills  9. Increased Fatigue  10. Increased Shortness of Breath  11. Decreased Flexibility/Joint Mobility  12. Edema/Girth INTERVENTIONS PLANNED:  1. Balance Exercise  2. Cold  3. Cryotherapy  4. Electrical Stimulation  5. Gait Training  6. Heat  7. Home Exercise Program (HEP)  8. Manual Therapy  9. Neuromuscular Re-education/Strengthening  10. Range of Motion (ROM)  11. Therapeutic Exercise/Strengthening  12. Transfer Training   TREATMENT PLAN:  Effective Dates: 8/23/2018 TO 11/15/2018. Frequency/Duration: 2 times a week for 12 weeks  GOALS: (Goals have been discussed and agreed upon with patient.)  Short-Term Functional Goals: Time Frame: 8/23/2018 to 10/4/2018  1. Patient demonstrates independence with home exercise program without verbal cueing provided by therapist.-met  2. Improve gait with decreased quad avoidance and improved heel to toe gait pattern.-met  3. Improve knee flexion ROM to 125 degrees by post operative week 12 in order to improve gait, transfers, and stairs. -met  Discharge Goals: Time Frame: 8/23/2018 to 11/15/2018  1.  Improve pain to 0-2/10 at the most with return to the gym for aerobic and weight training, walking for exercise, stairs, transfers, and standing for long periods of time.-met  2. Improve ROM to 0-140 degrees or more in order to normalize activities and return to exercise at the gym.-ongoing  3. Improve strength of gross hip and lower extremity to at least 4+/5 in order to improve gait, stairs, transfers, and ability to exercise at the gym.-met  4. Improve stair ascend and descend with use of hand rail reciprocally with ascend and descend.-met  5. Improve transfers with use of hands 0% of the time into and out of standard height chair.-met  6. Return patient to exercising at the gym and weight lifting with a good understanding of exercise progression, technique, and form with exercises. -ongoing  7. Improve Lower Extremity Functional Index score to 60/80 from 40/80.-ongoing  Rehabilitation Potential For Stated Goals: Good  Regarding Michael Tesfaye's therapy, I certify that the treatment plan above will be carried out by a therapist or under their direction. Thank you for this referral,  Lillian Barrios, PT, DPT, OCS    Referring Physician Signature: Johnnie Long MD              Date                    The information in this section was collected on 9/26/2018 (except where otherwise noted). HISTORY:   History of Present Injury/Illness (Reason for Referral):  Mr. Razia Perkins is a 62 y.o. male presenting s/p right quad tendon repair on 6/20/2018 repaired with addition of Arthrex ArthroFlex patch after patient fell on 6/15/2018 when he slipped and fell tearing his quad tendon. He reported being in a full extension knee immobilizer since surgery and has been discharged from the brace recently. He reported swelling, stiffness, pain, and weakness of the knee. He is eager to return to exercising at the gym, golf, and activities with less pain and dysfunction. Past Medical History/Comorbidities:   Mr. Razia Perkins  has a past medical history of Cancer ([address] Depression; and Hypercholesterolemia.   Mr. Razia Perkins  has a past surgical history that includes hx tonsillectomy. Social History/Living Environment:     Patient lives with his wife and reports assistance from her with any painful activities. Prior Level of Function/Work/Activity:  Independent without dysfunction. Patient works in business management. He is also very active with exercising at the gym including aerobic machines and weight lifting. Patient is also an avid golfer. Dominant Side:         RIGHT  Current Medications:     Current Outpatient Prescriptions:     aspirin delayed-release 81 mg tablet, Take 1 Tab by mouth every twelve (12) hours every twelve (12) hours. , Disp: 60 Tab, Rfl: 0    pregabalin (LYRICA) 75 mg capsule, Take 3 tabs po bid (Patient taking differently: Take 3 tabs po bid  Indications: Restless Legs Syndrome), Disp: 540 Cap, Rfl: 1    lisinopril (PRINIVIL, ZESTRIL) 10 mg tablet, TAKE 1 TABLET BY MOUTH DAILY (Patient taking differently: TAKE 1 TABLET BY MOUTH DAILY- pt takes once a week ( said if it makes me feel light headed to take it once a week)), Disp: 30 Tab, Rfl: 12    tamsulosin (FLOMAX) 0.4 mg capsule, TAKE 1 CAPSULE BY MOUTH DAILY, Disp: 30 Cap, Rfl: 12    lamoTRIgine (LAMICTAL) 25 mg tablet, Take 25 mg by mouth two (2) times a day., Disp: , Rfl:     venlafaxine-SR (EFFEXOR-XR) 75 mg capsule, Take 150 mg by mouth daily. , Disp: , Rfl:    Date Last Reviewed:  9/26/2018   Number of Personal Factors/Comorbidities that affect the Plan of Care: 0: LOW COMPLEXITY   EXAMINATION:     Observation/Postural and Gait Assessment: Patient ambulates with no gait deficits. Visual significant atrophy of the right quadriceps compared the the left. Incision is completely approximated with scar tissue and no drainage or infection. Anthropometric measurements (cm) Left Right   Knee joint line 40cm 40cm     Palpation:  No significant tenderness at right quadriceps or along incision. Patient reports mild \"aching\" at medial and lateral joint line.      ROM: AROM(PROM) Left Right   Knee flexion 153° 126°    Knee extension 4° knee flexion contracture 4° knee flexion contracture     Passive Accessory Mobility Testing: Gross mobility deficits in all directions of patellofemoral joint. Strength:     Manual Muscle Test (out of 5) Left Right   Knee extension 5 5   Knee flexion 5 4+   Hip flexion 5 5   Hip extension 4+ 4+   Hip abduction 4+ 4+   Ankle DF 5 4+   Ankle PF 5 4+     Special Tests:  Not tested due to acuity of surgery and symptoms. No signs or symptoms of infection or deep vein thrombosis at this time. Neurological Screen:  Myotomes: Key muscle strength testing for bilateral LE is WNL. Dermatomes: Sensation testing through bilateral LE for light touch is WNL. Functional Mobility:  Patient is generally safe and independent with most functional mobility but does require assistance with heavy lifting and carrying. Body Structures Involved:  1. Joints  2. Muscles Body Functions Affected:  1. Sensory/Pain  2. Neuromusculoskeletal Activities and Participation Affected:  1. General Tasks and Demands  2. Community, Social and [place]  3. Ability to exercise at the gym   Number of elements (examined above) that affect the Plan of Care: 3: MODERATE COMPLEXITY   CLINICAL PRESENTATION:   Presentation: Stable and uncomplicated: LOW COMPLEXITY   CLINICAL DECISION MAKING:   Outcome Measure: Tool Used: Lower Extremity Functional Scale (LEFS)  Score  Initial: 40/80 Most Recent: /80   Interpretation of Score: 20 questions each scored on a 5 point scale with 0 representing \"extreme difficulty or unable to perform\" and 4 representing \"no difficulty\". The lower the score, the greater the functional disability. 80/80 represents no disability. Minimal detectable change is 9 points.     Medical Necessity:   · Patient is expected to demonstrate progress in strength, range of motion, balance and coordination to improve tolerance to exercising at the gym and playing golf.  · Skilled intervention continues to be required due to weakness, decreased ROM, decreased flexibility, pain, and functional limitations. Reason for Services/Other Comments:  · Patient continues to require skilled intervention due to increasing complexity of exercises. Use of outcome tool(s) and clinical judgement create a POC that gives a: Clear prediction of patient's progress: LOW COMPLEXITY            TREATMENT:   (In addition to Assessment/Re-Assessment sessions the following treatments were rendered)    Pre-treatment Symptoms/Complaints:  Patient reports minimal pain this morning and reports varied episodes of mild pain with ADL's. Pain: Initial:   Pain Intensity 1: 1  Pain Location 1: Knee  Pain Orientation 1: Right  Post Session:  0/10      Therapeutic Exercise: (55 Minutes):  Exercises per grid below to improve mobility, strength, balance and coordination. Required minimal visual, verbal and manual cues to promote proper body alignment, promote proper body posture, promote proper body mechanics and promote proper body breathing techniques. Progressed resistance, range, repetitions and complexity of movement as indicated.     (used abbreviations: BET-back education training) Date:  9/26/2018 Date:  8/30/18 Date:  9/6/18   Activity/Exercise Parameters Parameters Parameters   Nustep  Level 3, 8 min, legs only Level 4, 10 min, legs only   Quad stretch, prone [id number]     Airdyne 10 min, st #6     Mini squats  2x10, standing at door 3x10, standing at door   Balance SLS, 20 sec x 4, ball toss     Lateral stepping 2x hallway, green     deadlift/squat 2x10, reaching between feet to 6 in box     Step ups  3x10, power ups, 8 in 2x10, right, 4 in 3x10, right, 6 inch   Calf stretch Strap 3 x 30 sec [id number], incline [id number], incline   Hamstring stretch Strap 3 x 30 sec [id number], supine [id number]   Stability ball HS curls 2x10, blue     Short arc quad  2x10, 5 sec holds    Straight leg raise flexion  2x10 2x10, 2lb   Sidelying hip abduction  2x10 2x10, 2lb   Prone hip extension  2x10 2x10, 2lb   Long arc quad   1x15   Standing heel raise 2 x 15 2x10 2x15   Sit to stand 2x10, no UE, chair with airex 2x5, no hands, chair with 2 airex 3x5, no hands, chair with one airex   Supine heel slides [id number], active [id number], active, supine [id number], active             Manual Therapy (     ): improve joint and soft tissue mobility   soft tissue mobilization of incision to decrease scar tissue adhesion  (Used abbreviations: MET - muscle energy technique; PNF - proprioceptive neuromuscular facilitation; NMR - neuromuscular re-education; a/p - anterior to posterior; p/a - posterior to anterior; NT - not tested)    Therapeutic Modalities: for edema and pain                                                                                               HEP: As above; handouts given to patient for all exercises. Treatment/Session Assessment:    · Response to Treatment:  Patient continues to tolerate and progress all exercises with no complaints. Patient increased AROM flexion to 126 degrees. Give LEFS outcome measure at next visit. · Compliance with Program/Exercises: compliant most of the time. · Recommendations/Intent for next treatment session: \"Next visit will focus on advancements to more challenging activities\".     Total Treatment Duration: 55 minutes  PT Patient Time In/Time Out  Time In: 0700  Time Out: 32 Ahmet Marte PT

## 2018-09-28 ENCOUNTER — HOSPITAL ENCOUNTER (OUTPATIENT)
Dept: PHYSICAL THERAPY | Age: 58
Discharge: HOME | End: 2018-09-28
Payer: COMMERCIAL

## 2018-10-05 ENCOUNTER — HOSPITAL ENCOUNTER (OUTPATIENT)
Dept: PHYSICAL THERAPY | Age: 58
Discharge: HOME | End: 2018-10-05

## 2018-10-05 NOTE — PROGRESS NOTES
Patient no-showed to his appointment again today. He was called and advised that he may have to be transitioned to 1 session at a time if he continues to have difficulty making appointments. He is out of town next week but is scheduled to return 10/17/2018.     Hiren Kaminski DPT

## 2018-10-10 ENCOUNTER — HOSPITAL ENCOUNTER (OUTPATIENT)
Dept: PHYSICAL THERAPY | Age: 58
Discharge: HOME | End: 2018-10-10

## 2018-10-12 ENCOUNTER — APPOINTMENT (OUTPATIENT)
Dept: PHYSICAL THERAPY | Age: 58
End: 2018-10-12

## 2018-10-17 ENCOUNTER — HOSPITAL ENCOUNTER (OUTPATIENT)
Dept: PHYSICAL THERAPY | Age: 58
Discharge: HOME | End: 2018-10-17

## 2018-10-19 ENCOUNTER — APPOINTMENT (OUTPATIENT)
Dept: PHYSICAL THERAPY | Age: 58
End: 2018-10-19

## 2018-10-24 ENCOUNTER — HOSPITAL ENCOUNTER (OUTPATIENT)
Dept: PHYSICAL THERAPY | Age: 58
Discharge: HOME | End: 2018-10-24

## 2018-10-26 ENCOUNTER — APPOINTMENT (OUTPATIENT)
Dept: PHYSICAL THERAPY | Age: 58
End: 2018-10-26

## 2018-10-31 ENCOUNTER — APPOINTMENT (OUTPATIENT)
Dept: PHYSICAL THERAPY | Age: 58
End: 2018-10-31

## 2018-11-02 ENCOUNTER — APPOINTMENT (OUTPATIENT)
Dept: PHYSICAL THERAPY | Age: 58
End: 2018-11-02

## 2018-11-07 ENCOUNTER — APPOINTMENT (OUTPATIENT)
Dept: PHYSICAL THERAPY | Age: 58
End: 2018-11-07

## 2018-11-08 ENCOUNTER — APPOINTMENT (RX ONLY)
Dept: URBAN - METROPOLITAN AREA CLINIC 349 | Facility: CLINIC | Age: 58
Setting detail: DERMATOLOGY
End: 2018-11-08

## 2018-11-08 DIAGNOSIS — L90.5 SCAR CONDITIONS AND FIBROSIS OF SKIN: ICD-10-CM

## 2018-11-08 DIAGNOSIS — Z85.828 PERSONAL HISTORY OF OTHER MALIGNANT NEOPLASM OF SKIN: ICD-10-CM

## 2018-11-08 DIAGNOSIS — L82.1 OTHER SEBORRHEIC KERATOSIS: ICD-10-CM

## 2018-11-08 DIAGNOSIS — L21.8 OTHER SEBORRHEIC DERMATITIS: ICD-10-CM

## 2018-11-08 PROCEDURE — 11900 INJECT SKIN LESIONS </W 7: CPT

## 2018-11-08 PROCEDURE — ? RECOMMENDATIONS

## 2018-11-08 PROCEDURE — ? COUNSELING

## 2018-11-08 PROCEDURE — ? DEFER

## 2018-11-08 PROCEDURE — ? INTRALESIONAL KENALOG

## 2018-11-08 PROCEDURE — 99213 OFFICE O/P EST LOW 20 MIN: CPT | Mod: 25

## 2018-11-08 ASSESSMENT — LOCATION SIMPLE DESCRIPTION DERM
LOCATION SIMPLE: LEFT PRETIBIAL REGION
LOCATION SIMPLE: LEFT CHEEK
LOCATION SIMPLE: RIGHT FOREHEAD
LOCATION SIMPLE: RIGHT SCALP
LOCATION SIMPLE: LEFT PRETIBIAL REGION
LOCATION SIMPLE: LEFT LOWER BACK
LOCATION SIMPLE: LEFT LOWER BACK
LOCATION SIMPLE: LEFT CHEEK
LOCATION SIMPLE: RIGHT PRETIBIAL REGION
LOCATION SIMPLE: SUPERIOR FOREHEAD
LOCATION SIMPLE: UPPER BACK

## 2018-11-08 ASSESSMENT — LOCATION DETAILED DESCRIPTION DERM
LOCATION DETAILED: LEFT LATERAL MALAR CHEEK
LOCATION DETAILED: LEFT INFERIOR MEDIAL MIDBACK
LOCATION DETAILED: RIGHT SUPERIOR MEDIAL FOREHEAD
LOCATION DETAILED: LEFT INFERIOR LATERAL MALAR CHEEK
LOCATION DETAILED: LEFT LATERAL MALAR CHEEK
LOCATION DETAILED: LEFT DISTAL PRETIBIAL REGION
LOCATION DETAILED: LEFT INFERIOR CENTRAL MALAR CHEEK
LOCATION DETAILED: RIGHT DISTAL PRETIBIAL REGION
LOCATION DETAILED: LEFT DISTAL PRETIBIAL REGION
LOCATION DETAILED: RIGHT CENTRAL FRONTAL SCALP
LOCATION DETAILED: LEFT SUPERIOR MEDIAL MIDBACK
LOCATION DETAILED: INFERIOR THORACIC SPINE
LOCATION DETAILED: SUPERIOR MID FOREHEAD

## 2018-11-08 ASSESSMENT — LOCATION ZONE DERM
LOCATION ZONE: LEG
LOCATION ZONE: FACE
LOCATION ZONE: TRUNK
LOCATION ZONE: TRUNK
LOCATION ZONE: SCALP
LOCATION ZONE: FACE
LOCATION ZONE: LEG

## 2018-11-08 NOTE — PROCEDURE: INTRALESIONAL KENALOG
Administered By (Optional): Dr Montaño
X Size Of Lesion In Cm (Optional): 0
Concentration Of Solution Injected (Mg/Ml): 40.0
Include Z78.9 (Other Specified Conditions Influencing Health Status) As An Associated Diagnosis?: No
Total Volume Injected (Ccs- Only Use Numbers And Decimals): 0.1
Consent: The risks of atrophy were reviewed with the patient.
Detail Level: Zone
Medical Necessity Clause: This procedure was medically necessary because the lesions that were treated were:
Kenalog Preparation: Kenalog

## 2018-11-08 NOTE — PROCEDURE: RECOMMENDATIONS
Recommendations (Free Text): Stop using bar soap for scalp \\nGet OTC anti dandruff shampoo
Detail Level: Zone

## 2018-11-09 ENCOUNTER — APPOINTMENT (OUTPATIENT)
Dept: PHYSICAL THERAPY | Age: 58
End: 2018-11-09

## 2018-11-27 NOTE — THERAPY DISCHARGE
Tay Wilson  : 1960  Primary: Yassine Castillo Branden Rpn  Secondary:  [address] at [address], [place], [address] [place] [address]  [phone]   [phone]         OUTPATIENT PHYSICAL THERAPY:Discontinuation Summary 2018    ICD-10: Treatment Diagnosis 1: strain of right quadriceps muscle, fascia and tendon, initial encounter (S76.11A)  Treatment Diagnosis 2: right knee pain (M25.561)  Treatment Diagnosis 3: gait dysfunction (R26.89)  Precautions: Falls risk due to fall causing injury - supervise patient with session  Allergies:   Keflex [cephalexin]   Fall Risk Score: 7 (? 5 = High Risk)  MD Orders: Evaluate and Treat  Physician Guideline: Knee flexion to 120 degrees by week 12, to 140 or more by week 16, eccentrics by week 16 MEDICAL/REFERRING DIAGNOSIS:  Strain of right quadriceps muscle, fascia and tendon, initial encounter [S75.999H]  Other reduced mobility [Z74.09]   DATE OF ONSET: 6/15/2018 when patient slipped and fell tearing his quad tendon, s/p repair of quad tendon 2018 with addition of Arthrex ArthroFlex patch  REFERRING PHYSICIAN: Antonia Zimmerman MD  RETURN PHYSICIAN APPOINTMENT: 2018     INITIAL ASSESSMENT:  Mr. Gideon Gaytan is a 62 y.o. male presenting s/p right quad tendon repair on 2018 repaired with addition of Arthrex ArthroFlex patch after patient fell on 6/15/2018 when he slipped and fell tearing his quad tendon. He reported being in a full extension knee immobilizer since surgery and has been discharged from the brace recently. He reported swelling, stiffness, pain, and weakness of the knee. He is eager to return to exercising at the gym, golf, and activities with less pain and dysfunction. Patient has been seen for 4 sessions from 18 to 18. Patient has missed multiple sessions due to work travel but continues to make good progress towards all goals.   He no-showed and/or cancelled 10 appointments of his total 14 scheduled appointments. He was called after the last appointment and advised that we may want to continue therapy with scheduling 1 visit at a time, but he called back to cancel his appointments entirely. The last time patient was spoken to, he was reporting improved ROM and minimal to no pain with his self rehab program.  He has met most preset goals, and is discharged to [facility] unless he calls back to schedule more visits. PROBLEM LIST (Impacting functional limitations):  1. Decreased Strength  2. Decreased ADL/Functional Activities  3. Decreased Transfer Abilities  4. Decreased Ambulation Ability/Technique  5. Decreased Balance  6. Increased Pain  7. Decreased Activity Tolerance  8. Decreased Pacing Skills  9. Increased Fatigue  10. Increased Shortness of Breath  11. Decreased Flexibility/Joint Mobility  12. Edema/Girth INTERVENTIONS PLANNED:  1. Balance Exercise  2. Cold  3. Cryotherapy  4. Electrical Stimulation  5. Gait Training  6. Heat  7. Home Exercise Program (HEP)  8. Manual Therapy  9. Neuromuscular Re-education/Strengthening  10. Range of Motion (ROM)  11. Therapeutic Exercise/Strengthening  12. Transfer Training   TREATMENT PLAN:  Effective Dates: 8/23/2018 TO 11/15/2018. Frequency/Duration: Patient has been seen for 4 sessions from 8/23/18 to 9/26/18. Patient has missed multiple sessions due to work travel but continues to make good progress towards all goals. He no-showed and/or cancelled 10 appointments of his total 14 scheduled appointments. He was called after the last appointment and advised that we may want to continue therapy with scheduling 1 visit at a time, but he called back to cancel his appointments entirely. The last time patient was spoken to, he was reporting improved ROM and minimal to no pain with his self rehab program.  He has met most preset goals, and is discharged to [facility] unless he calls back to schedule more visits.   GOALS: (Goals have been discussed and agreed upon with patient.)  Short-Term Functional Goals: Time Frame: 8/23/2018 to 10/4/2018  1. Patient demonstrates independence with home exercise program without verbal cueing provided by therapist.- GOAL MET  2. Improve gait with decreased quad avoidance and improved heel to toe gait pattern. - GOAL MET  3. Improve knee flexion ROM to 125 degrees by post operative week 12 in order to improve gait, transfers, and stairs.- GOAL MET  Discharge Goals: Time Frame: 8/23/2018 to 11/15/2018  1. Improve pain to 0-2/10 at the most with return to the gym for aerobic and weight training, walking for exercise, stairs, transfers, and standing for long periods of time. - GOAL MET  2. Improve ROM to 0-140 degrees or more in order to normalize activities and return to exercise at the gym.- NOT MET  3. Improve strength of gross hip and lower extremity to at least 4+/5 in order to improve gait, stairs, transfers, and ability to exercise at the gym.- GOAL MET  4. Improve stair ascend and descend with use of hand rail reciprocally with ascend and descend. - GOAL MET  5. Improve transfers with use of hands 0% of the time into and out of standard height chair.- GOAL MET  6. Return patient to exercising at the gym and weight lifting with a good understanding of exercise progression, technique, and form with exercises.- NOT MET  7. Improve Lower Extremity Functional Index score to 60/80 from 40/80.- NOT MET  Rehabilitation Potential For Stated Goals: Good  Regarding Sergei Tesfaye's therapy, I certify that the treatment plan above will be carried out by a therapist or under their direction.   Thank you for this referral,  Humberto Helm, PT, DPT, OCS

## 2018-12-21 ENCOUNTER — HOSPITAL ENCOUNTER (OUTPATIENT)
Dept: GENERAL RADIOLOGY | Age: 58
Discharge: HOME | End: 2018-12-21
Payer: COMMERCIAL

## 2018-12-21 DIAGNOSIS — R05.9 COUGH: ICD-10-CM

## 2018-12-21 PROCEDURE — 71046 X-RAY EXAM CHEST 2 VIEWS: CPT

## 2018-12-24 NOTE — PROGRESS NOTES
Pt was made aware of his chest x-ray and he stated he is not feeling any better and requested to be see again today. Stephani was made for today.

## 2019-04-19 ENCOUNTER — HOSPITAL ENCOUNTER (OUTPATIENT)
Dept: LAB | Age: 59
Discharge: HOME | End: 2019-04-19
Payer: COMMERCIAL

## 2019-04-19 DIAGNOSIS — M79.10 MYALGIA: ICD-10-CM

## 2019-04-19 DIAGNOSIS — R53.83 FATIGUE, UNSPECIFIED TYPE: ICD-10-CM

## 2019-04-19 LAB
ALBUMIN SERPL-MCNC: 4 G/DL (ref 3.5–5)
ALBUMIN/GLOB SERPL: 1.4 {RATIO}
ALP SERPL-CCNC: 66 U/L (ref 50–136)
ALT SERPL-CCNC: 29 U/L (ref 12–65)
ANION GAP SERPL CALC-SCNC: 5 MMOL/L
AST SERPL-CCNC: 12 U/L (ref 15–37)
BASOPHILS # BLD: 0 K/UL (ref 0–0.2)
BASOPHILS NFR BLD: 1 % (ref 0–2)
BILIRUB SERPL-MCNC: 0.3 MG/DL (ref 0.2–1.1)
BUN SERPL-MCNC: 14 MG/DL (ref 6–23)
CALCIUM SERPL-MCNC: 8.9 MG/DL (ref 8.3–10.4)
CHLORIDE SERPL-SCNC: 105 MMOL/L (ref 98–107)
CK SERPL-CCNC: 99 U/L (ref 21–215)
CO2 SERPL-SCNC: 32 MMOL/L (ref 21–32)
CREAT SERPL-MCNC: 1.1 MG/DL (ref 0.8–1.5)
DIFFERENTIAL METHOD BLD: ABNORMAL
EOSINOPHIL # BLD: 0.1 K/UL (ref 0–0.8)
EOSINOPHIL NFR BLD: 3 % (ref 0.5–7.8)
ERYTHROCYTE [DISTWIDTH] IN BLOOD BY AUTOMATED COUNT: 12.7 % (ref 11.9–14.6)
ERYTHROCYTE [SEDIMENTATION RATE] IN BLOOD: 5 MM/HR (ref 0–20)
GLOBULIN SER CALC-MCNC: 2.8 G/DL
GLUCOSE SERPL-MCNC: 112 MG/DL (ref 65–100)
HCT VFR BLD AUTO: 44.5 % (ref 41.1–50.3)
HGB BLD-MCNC: 15.1 G/DL (ref 13.6–17.2)
IMM GRANULOCYTES # BLD AUTO: 0 K/UL (ref 0–0.5)
IMM GRANULOCYTES NFR BLD AUTO: 0 % (ref 0–5)
LYMPHOCYTES # BLD: 0.7 K/UL (ref 0.5–4.6)
LYMPHOCYTES NFR BLD: 21 % (ref 13–44)
MCH RBC QN AUTO: 29.7 PG (ref 26.1–32.9)
MCHC RBC AUTO-ENTMCNC: 33.9 G/DL (ref 31.4–35)
MCV RBC AUTO: 87.4 FL (ref 79.6–97.8)
MONOCYTES # BLD: 0.4 K/UL (ref 0.1–1.3)
MONOCYTES NFR BLD: 12 % (ref 4–12)
NEUTS SEG # BLD: 2.1 K/UL (ref 1.7–8.2)
NEUTS SEG NFR BLD: 64 % (ref 43–78)
NRBC # BLD: 0 K/UL (ref 0–0.2)
PLATELET # BLD AUTO: 167 K/UL (ref 150–450)
PMV BLD AUTO: 9.6 FL (ref 9.4–12.3)
POTASSIUM SERPL-SCNC: 3.8 MMOL/L (ref 3.5–5.1)
PROT SERPL-MCNC: 6.8 G/DL (ref 6.3–8.2)
RBC # BLD AUTO: 5.09 M/UL (ref 4.23–5.6)
SODIUM SERPL-SCNC: 142 MMOL/L (ref 136–145)
TSH SERPL DL<=0.005 MIU/L-ACNC: 1.19 UIU/ML (ref 0.36–3.74)
WBC # BLD AUTO: 3.3 K/UL (ref 4.3–11.1)

## 2019-04-19 PROCEDURE — 36415 COLL VENOUS BLD VENIPUNCTURE: CPT

## 2019-04-19 PROCEDURE — 85025 COMPLETE CBC W/AUTO DIFF WBC: CPT

## 2019-04-19 PROCEDURE — 82085 ASSAY OF ALDOLASE: CPT

## 2019-04-19 PROCEDURE — 85652 RBC SED RATE AUTOMATED: CPT

## 2019-04-19 PROCEDURE — 82550 ASSAY OF CK (CPK): CPT

## 2019-04-19 PROCEDURE — 80053 COMPREHEN METABOLIC PANEL: CPT

## 2019-04-19 PROCEDURE — 84443 ASSAY THYROID STIM HORMONE: CPT

## 2019-04-20 LAB — ALDOLASE SERPL-CCNC: 3.8 U/L (ref 3.3–10.3)

## 2019-10-30 ENCOUNTER — HOSPITAL ENCOUNTER (OUTPATIENT)
Dept: MRI IMAGING | Age: 59
Discharge: HOME | End: 2019-10-30
Attending: PSYCHIATRY & NEUROLOGY
Payer: COMMERCIAL

## 2019-10-30 DIAGNOSIS — M48.04 THORACIC STENOSIS: ICD-10-CM

## 2019-10-30 PROCEDURE — 72146 MRI CHEST SPINE W/O DYE: CPT

## 2019-11-07 ENCOUNTER — APPOINTMENT (RX ONLY)
Dept: URBAN - METROPOLITAN AREA CLINIC 349 | Facility: CLINIC | Age: 59
Setting detail: DERMATOLOGY
End: 2019-11-07

## 2019-11-07 DIAGNOSIS — L82.1 OTHER SEBORRHEIC KERATOSIS: ICD-10-CM

## 2019-11-07 DIAGNOSIS — Z85.828 PERSONAL HISTORY OF OTHER MALIGNANT NEOPLASM OF SKIN: ICD-10-CM

## 2019-11-07 DIAGNOSIS — D22 MELANOCYTIC NEVI: ICD-10-CM

## 2019-11-07 DIAGNOSIS — Z71.89 OTHER SPECIFIED COUNSELING: ICD-10-CM

## 2019-11-07 DIAGNOSIS — L82.0 INFLAMED SEBORRHEIC KERATOSIS: ICD-10-CM

## 2019-11-07 DIAGNOSIS — D18.0 HEMANGIOMA: ICD-10-CM

## 2019-11-07 PROBLEM — F32.9 MAJOR DEPRESSIVE DISORDER, SINGLE EPISODE, UNSPECIFIED: Status: ACTIVE | Noted: 2019-11-07

## 2019-11-07 PROBLEM — D22.71 MELANOCYTIC NEVI OF RIGHT LOWER LIMB, INCLUDING HIP: Status: ACTIVE | Noted: 2019-11-07

## 2019-11-07 PROBLEM — D18.01 HEMANGIOMA OF SKIN AND SUBCUTANEOUS TISSUE: Status: ACTIVE | Noted: 2019-11-07

## 2019-11-07 PROBLEM — Z85.79 PERSONAL HISTORY OF OTHER MALIGNANT NEOPLASMS OF LYMPHOID, HEMATOPOIETIC AND RELATED TISSUES: Status: ACTIVE | Noted: 2019-11-07

## 2019-11-07 PROBLEM — D22.5 MELANOCYTIC NEVI OF TRUNK: Status: ACTIVE | Noted: 2019-11-07

## 2019-11-07 PROCEDURE — ? LIQUID NITROGEN

## 2019-11-07 PROCEDURE — 99213 OFFICE O/P EST LOW 20 MIN: CPT | Mod: 25

## 2019-11-07 PROCEDURE — 17110 DESTRUCTION B9 LES UP TO 14: CPT

## 2019-11-07 PROCEDURE — ? COUNSELING

## 2019-11-07 ASSESSMENT — LOCATION SIMPLE DESCRIPTION DERM
LOCATION SIMPLE: RIGHT UPPER ARM
LOCATION SIMPLE: LEFT PRETIBIAL REGION
LOCATION SIMPLE: RIGHT PRETIBIAL REGION
LOCATION SIMPLE: RIGHT UPPER BACK
LOCATION SIMPLE: RIGHT LOWER BACK
LOCATION SIMPLE: ABDOMEN
LOCATION SIMPLE: CHEST
LOCATION SIMPLE: LEFT THIGH
LOCATION SIMPLE: LOWER BACK
LOCATION SIMPLE: RIGHT THIGH

## 2019-11-07 ASSESSMENT — LOCATION DETAILED DESCRIPTION DERM
LOCATION DETAILED: LEFT PROXIMAL PRETIBIAL REGION
LOCATION DETAILED: LEFT ANTERIOR PROXIMAL THIGH
LOCATION DETAILED: PERIUMBILICAL SKIN
LOCATION DETAILED: RIGHT ANTERIOR PROXIMAL THIGH
LOCATION DETAILED: LEFT MEDIAL SUPERIOR CHEST
LOCATION DETAILED: LEFT MEDIAL INFERIOR CHEST
LOCATION DETAILED: SUPERIOR LUMBAR SPINE
LOCATION DETAILED: RIGHT INFERIOR UPPER BACK
LOCATION DETAILED: RIGHT PROXIMAL PRETIBIAL REGION
LOCATION DETAILED: RIGHT INFERIOR MEDIAL MIDBACK
LOCATION DETAILED: RIGHT ANTERIOR DISTAL UPPER ARM
LOCATION DETAILED: EPIGASTRIC SKIN

## 2019-11-07 ASSESSMENT — LOCATION ZONE DERM
LOCATION ZONE: LEG
LOCATION ZONE: ARM
LOCATION ZONE: TRUNK

## 2019-11-07 NOTE — PROCEDURE: LIQUID NITROGEN
Medical Necessity Information: It is in your best interest to select a reason for this procedure from the list below. All of these items fulfill various CMS LCD requirements except the new and changing color options.
Detail Level: Detailed
Number Of Freeze-Thaw Cycles: 3 freeze-thaw cycles
Render Note In Bullet Format When Appropriate: No
Medical Necessity Clause: This procedure was medically necessary because the lesions that were treated were:
Duration Of Freeze Thaw-Cycle (Seconds): 2
Include Z78.9 (Other Specified Conditions Influencing Health Status) As An Associated Diagnosis?: Yes
Post-Care Instructions: I reviewed with the patient in detail post-care instructions. Patient is to wear sunprotection, and avoid picking at any of the treated lesions. Pt may apply Vaseline to crusted or scabbing areas.
Consent: The patient's consent was obtained including but not limited to risks of crusting, scabbing, blistering, scarring, darker or lighter pigmentary change, recurrence, incomplete removal and infection.

## 2019-11-29 ENCOUNTER — HOSPITAL ENCOUNTER (OUTPATIENT)
Dept: NUCLEAR MEDICINE | Age: 59
Discharge: HOME | End: 2019-11-29
Attending: PSYCHIATRY & NEUROLOGY
Payer: COMMERCIAL

## 2019-11-29 DIAGNOSIS — R94.8 ABNORMAL BONE SCAN OF THORACIC SPINE: ICD-10-CM

## 2019-11-29 DIAGNOSIS — D18.09 HEMANGIOMA OF BONE: ICD-10-CM

## 2019-11-29 PROCEDURE — 78306 BONE IMAGING WHOLE BODY: CPT

## 2020-03-19 PROBLEM — R79.89 LOW TESTOSTERONE: Status: ACTIVE | Noted: 2020-03-19

## 2020-04-28 ENCOUNTER — HOSPICE ADMISSION (OUTPATIENT)
Dept: HOSPICE | Facility: HOSPICE | Age: 60
End: 2020-04-28

## 2020-07-13 ENCOUNTER — HOSPITAL ENCOUNTER (OUTPATIENT)
Dept: CT IMAGING | Age: 60
Discharge: HOME | End: 2020-07-13
Attending: INTERNAL MEDICINE

## 2020-07-13 DIAGNOSIS — C82.93 FOLLICULAR LYMPHOMA OF INTRA-ABDOMINAL LYMPH NODES, UNSPECIFIED FOLLICULAR LYMPHOMA TYPE (HCC): ICD-10-CM

## 2020-07-13 RX ORDER — SODIUM CHLORIDE 0.9 % (FLUSH) 0.9 %
10 SYRINGE (ML) INJECTION
Status: COMPLETED | OUTPATIENT
Start: 2020-07-13 | End: 2020-07-13

## 2020-07-13 RX ADMIN — Medication 10 ML: at 09:57

## 2020-07-14 ENCOUNTER — HOSPITAL ENCOUNTER (OUTPATIENT)
Dept: LAB | Age: 60
Discharge: HOME | End: 2020-07-14
Payer: COMMERCIAL

## 2020-07-14 DIAGNOSIS — C82.93 FOLLICULAR LYMPHOMA OF INTRA-ABDOMINAL LYMPH NODES, UNSPECIFIED FOLLICULAR LYMPHOMA TYPE (HCC): ICD-10-CM

## 2020-07-14 LAB
ALBUMIN SERPL-MCNC: 3.5 G/DL (ref 3.2–4.6)
ALBUMIN/GLOB SERPL: 1.2 {RATIO} (ref 1.2–3.5)
ALP SERPL-CCNC: 71 U/L (ref 50–136)
ALT SERPL-CCNC: 56 U/L (ref 12–65)
ANION GAP SERPL CALC-SCNC: 4 MMOL/L (ref 7–16)
AST SERPL-CCNC: 25 U/L (ref 15–37)
BASOPHILS # BLD: 0 K/UL (ref 0–0.2)
BASOPHILS NFR BLD: 0 % (ref 0–2)
BILIRUB SERPL-MCNC: 0.4 MG/DL (ref 0.2–1.1)
BUN SERPL-MCNC: 17 MG/DL (ref 8–23)
CALCIUM SERPL-MCNC: 8.6 MG/DL (ref 8.3–10.4)
CHLORIDE SERPL-SCNC: 104 MMOL/L (ref 98–107)
CO2 SERPL-SCNC: 31 MMOL/L (ref 21–32)
CREAT SERPL-MCNC: 1.3 MG/DL (ref 0.8–1.5)
DIFFERENTIAL METHOD BLD: ABNORMAL
EOSINOPHIL # BLD: 0.1 K/UL (ref 0–0.8)
EOSINOPHIL NFR BLD: 4 % (ref 0.5–7.8)
ERYTHROCYTE [DISTWIDTH] IN BLOOD BY AUTOMATED COUNT: 12.4 % (ref 11.9–14.6)
GLOBULIN SER CALC-MCNC: 3 G/DL (ref 2.3–3.5)
GLUCOSE SERPL-MCNC: 137 MG/DL (ref 65–100)
HCT VFR BLD AUTO: 42.4 % (ref 41.1–50.3)
HGB BLD-MCNC: 14.3 G/DL (ref 13.6–17.2)
IMM GRANULOCYTES # BLD AUTO: 0 K/UL (ref 0–0.5)
IMM GRANULOCYTES NFR BLD AUTO: 0 % (ref 0–5)
LYMPHOCYTES # BLD: 0.5 K/UL (ref 0.5–4.6)
LYMPHOCYTES NFR BLD: 19 % (ref 13–44)
MCH RBC QN AUTO: 29.1 PG (ref 26.1–32.9)
MCHC RBC AUTO-ENTMCNC: 33.7 G/DL (ref 31.4–35)
MCV RBC AUTO: 86.2 FL (ref 79.6–97.8)
MONOCYTES # BLD: 0.5 K/UL (ref 0.1–1.3)
MONOCYTES NFR BLD: 19 % (ref 4–12)
NEUTS SEG # BLD: 1.5 K/UL (ref 1.7–8.2)
NEUTS SEG NFR BLD: 57 % (ref 43–78)
NRBC # BLD: 0 K/UL (ref 0–0.2)
PLATELET # BLD AUTO: 157 K/UL (ref 150–450)
PMV BLD AUTO: 9.5 FL (ref 9.4–12.3)
POTASSIUM SERPL-SCNC: 4.4 MMOL/L (ref 3.5–5.1)
PROT SERPL-MCNC: 6.5 G/DL (ref 6.3–8.2)
RBC # BLD AUTO: 4.92 M/UL (ref 4.23–5.67)
SODIUM SERPL-SCNC: 139 MMOL/L (ref 136–145)
WBC # BLD AUTO: 2.5 K/UL (ref 4.3–11.1)

## 2020-07-14 PROCEDURE — 36415 COLL VENOUS BLD VENIPUNCTURE: CPT

## 2020-07-14 PROCEDURE — 80053 COMPREHEN METABOLIC PANEL: CPT

## 2020-07-14 PROCEDURE — 85025 COMPLETE CBC W/AUTO DIFF WBC: CPT

## 2022-03-18 PROBLEM — I10 ESSENTIAL HYPERTENSION: Status: ACTIVE | Noted: 2017-12-04

## 2022-03-19 PROBLEM — R79.89 LOW TESTOSTERONE: Status: ACTIVE | Noted: 2020-03-19

## 2022-03-19 PROBLEM — N40.1 BENIGN PROSTATIC HYPERPLASIA WITH HESITANCY: Status: ACTIVE | Noted: 2018-01-05

## 2022-03-19 PROBLEM — R39.11 BENIGN PROSTATIC HYPERPLASIA WITH HESITANCY: Status: ACTIVE | Noted: 2018-01-05

## 2022-03-19 PROBLEM — F33.40 DEPRESSION, MAJOR, RECURRENT, IN REMISSION (HCC): Status: ACTIVE | Noted: 2018-01-05

## (undated) DEVICE — SOLUTION IV 1000ML 0.9% SOD CHL

## (undated) DEVICE — BANDAGE COMPR SELF ADH 5 YDX6 IN TAN STRL PREMIERPRO LF

## (undated) DEVICE — (D)PREP SKN CHLRAPRP APPL 26ML -- CONVERT TO ITEM 371833

## (undated) DEVICE — SLIM BODY SKIN STAPLER: Brand: APPOSE ULC

## (undated) DEVICE — PADDING CAST W4INXL4YD ST COT COHESIVE HND TEARABLE SPEC

## (undated) DEVICE — Device

## (undated) DEVICE — REM POLYHESIVE ADULT PATIENT RETURN ELECTRODE: Brand: VALLEYLAB

## (undated) DEVICE — ZIMMER® STERILE DISPOSABLE TOURNIQUET CUFF WITH PLC, DUAL PORT, SINGLE BLADDER, 30 IN. (76 CM)

## (undated) DEVICE — SURGICAL PROCEDURE PACK BASIC ST FRANCIS

## (undated) DEVICE — T-DRAPE,EXTREMITY,STERILE: Brand: MEDLINE

## (undated) DEVICE — SPONGE LAP 18X18IN STRL -- 5/PK

## (undated) DEVICE — 2000CC GUARDIAN II: Brand: GUARDIAN

## (undated) DEVICE — BUTTON SWITCH PENCIL BLADE ELECTRODE, HOLSTER: Brand: EDGE

## (undated) DEVICE — KENDALL SCD EXPRESS SLEEVES, KNEE LENGTH, MEDIUM: Brand: KENDALL SCD

## (undated) DEVICE — STERILE TETRA-FLEX CF LF, 6IN X 11 YD: Brand: TETRA-FLEX™ CF

## (undated) DEVICE — SUTURE MCRYL SZ 2-0 L27IN ABSRB UD CP-1 1 L36MM 1/2 CIR REV Y266H

## (undated) DEVICE — GOWN,SIRUS,NONRNF,SETINSLV,XL,20/CS: Brand: MEDLINE

## (undated) DEVICE — STOCKINETTE,IMPERVIOUS,12X48,STERILE: Brand: MEDLINE

## (undated) DEVICE — AMD ANTIMICROBIAL NON-ADHERENT PAD,0.2% POLYHEXAMETHYLENE BIGUANIDE HCI (PHMB): Brand: TELFA